# Patient Record
Sex: FEMALE | Race: WHITE | NOT HISPANIC OR LATINO | Employment: OTHER | URBAN - NONMETROPOLITAN AREA
[De-identification: names, ages, dates, MRNs, and addresses within clinical notes are randomized per-mention and may not be internally consistent; named-entity substitution may affect disease eponyms.]

---

## 2023-03-14 DIAGNOSIS — J30.9 ALLERGIC RHINITIS, UNSPECIFIED SEASONALITY, UNSPECIFIED TRIGGER: Primary | ICD-10-CM

## 2023-03-14 DIAGNOSIS — E78.00 HYPERCHOLESTEREMIA: ICD-10-CM

## 2023-03-14 RX ORDER — FLUTICASONE PROPIONATE 50 MCG
1 SPRAY, SUSPENSION (ML) NASAL DAILY
COMMUNITY
Start: 2019-10-15 | End: 2023-03-14 | Stop reason: SDUPTHER

## 2023-03-14 RX ORDER — LOSARTAN POTASSIUM 100 MG/1
100 TABLET ORAL DAILY
Qty: 90 TABLET | Refills: 3 | Status: SHIPPED | OUTPATIENT
Start: 2023-03-14 | End: 2024-01-10

## 2023-03-14 RX ORDER — FLUTICASONE PROPIONATE 50 MCG
1 SPRAY, SUSPENSION (ML) NASAL DAILY
Qty: 16 G | Refills: 3 | Status: SHIPPED | OUTPATIENT
Start: 2023-03-14 | End: 2023-08-17

## 2023-03-14 RX ORDER — ATORVASTATIN CALCIUM 10 MG/1
10 TABLET, FILM COATED ORAL NIGHTLY
Qty: 90 TABLET | Refills: 3 | Status: SHIPPED | OUTPATIENT
Start: 2023-03-14 | End: 2024-01-10

## 2023-03-14 RX ORDER — LOSARTAN POTASSIUM 100 MG/1
1 TABLET ORAL DAILY
COMMUNITY
Start: 2019-10-15 | End: 2023-03-14 | Stop reason: SDUPTHER

## 2023-03-14 RX ORDER — ATORVASTATIN CALCIUM 10 MG/1
1 TABLET, FILM COATED ORAL NIGHTLY
COMMUNITY
Start: 2019-10-15 | End: 2023-03-14 | Stop reason: SDUPTHER

## 2023-03-23 ENCOUNTER — HOSPITAL ENCOUNTER (OUTPATIENT)
Dept: DATA CONVERSION | Facility: HOSPITAL | Age: 78
End: 2023-03-23
Attending: INTERNAL MEDICINE | Admitting: INTERNAL MEDICINE
Payer: MEDICARE

## 2023-03-23 ENCOUNTER — TELEPHONE (OUTPATIENT)
Dept: PRIMARY CARE | Facility: CLINIC | Age: 78
End: 2023-03-23
Payer: MEDICARE

## 2023-03-23 DIAGNOSIS — E78.00 PURE HYPERCHOLESTEROLEMIA, UNSPECIFIED: ICD-10-CM

## 2023-03-23 DIAGNOSIS — K64.8 OTHER HEMORRHOIDS: ICD-10-CM

## 2023-03-23 DIAGNOSIS — D12.2 BENIGN NEOPLASM OF ASCENDING COLON: ICD-10-CM

## 2023-03-23 DIAGNOSIS — D12.5 BENIGN NEOPLASM OF SIGMOID COLON: ICD-10-CM

## 2023-03-23 DIAGNOSIS — Z90.49 ACQUIRED ABSENCE OF OTHER SPECIFIED PARTS OF DIGESTIVE TRACT: ICD-10-CM

## 2023-03-23 DIAGNOSIS — Z86.010 PERSONAL HISTORY OF COLONIC POLYPS: ICD-10-CM

## 2023-03-23 DIAGNOSIS — Z12.11 ENCOUNTER FOR SCREENING FOR MALIGNANT NEOPLASM OF COLON: ICD-10-CM

## 2023-03-23 DIAGNOSIS — K57.30 DIVERTICULOSIS OF LARGE INTESTINE WITHOUT PERFORATION OR ABSCESS WITHOUT BLEEDING: ICD-10-CM

## 2023-03-23 DIAGNOSIS — D12.3 BENIGN NEOPLASM OF TRANSVERSE COLON: ICD-10-CM

## 2023-03-23 DIAGNOSIS — I10 ESSENTIAL (PRIMARY) HYPERTENSION: ICD-10-CM

## 2023-03-23 DIAGNOSIS — Z90.710 ACQUIRED ABSENCE OF BOTH CERVIX AND UTERUS: ICD-10-CM

## 2023-04-03 LAB
COMPLETE PATHOLOGY REPORT: NORMAL
CONVERTED CLINICAL DIAGNOSIS-HISTORY: NORMAL
CONVERTED FINAL DIAGNOSIS: NORMAL
CONVERTED FINAL REPORT PDF LINK TO COPY AND PASTE: NORMAL
CONVERTED GROSS DESCRIPTION: NORMAL

## 2023-04-18 ENCOUNTER — OFFICE VISIT (OUTPATIENT)
Dept: PRIMARY CARE | Facility: CLINIC | Age: 78
End: 2023-04-18
Payer: MEDICARE

## 2023-04-18 VITALS
HEIGHT: 63 IN | WEIGHT: 171 LBS | DIASTOLIC BLOOD PRESSURE: 70 MMHG | SYSTOLIC BLOOD PRESSURE: 144 MMHG | BODY MASS INDEX: 30.3 KG/M2 | HEART RATE: 84 BPM

## 2023-04-18 DIAGNOSIS — M54.50 LOW BACK PAIN RADIATING TO RIGHT LEG: Primary | ICD-10-CM

## 2023-04-18 DIAGNOSIS — M79.604 LOW BACK PAIN RADIATING TO RIGHT LEG: Primary | ICD-10-CM

## 2023-04-18 PROBLEM — F43.20 ADJUSTMENT DISORDER: Status: ACTIVE | Noted: 2023-04-18

## 2023-04-18 PROBLEM — Z86.0100 HISTORY OF COLON POLYPS: Status: ACTIVE | Noted: 2023-04-18

## 2023-04-18 PROBLEM — Z86.79 H/O ABDOMINAL AORTIC ANEURYSM: Status: ACTIVE | Noted: 2023-04-18

## 2023-04-18 PROBLEM — M81.0 OSTEOPOROSIS: Status: ACTIVE | Noted: 2023-04-18

## 2023-04-18 PROBLEM — E66.9 OBESITY: Status: ACTIVE | Noted: 2023-04-18

## 2023-04-18 PROBLEM — K44.9 HERNIA, HIATAL: Status: ACTIVE | Noted: 2023-04-18

## 2023-04-18 PROBLEM — J30.9 ALLERGIC RHINITIS: Status: ACTIVE | Noted: 2023-04-18

## 2023-04-18 PROBLEM — I10 HTN (HYPERTENSION): Status: ACTIVE | Noted: 2023-04-18

## 2023-04-18 PROBLEM — E78.00 HYPERCHOLESTEROLEMIA: Status: ACTIVE | Noted: 2023-04-18

## 2023-04-18 PROBLEM — E11.9 DM2 (DIABETES MELLITUS, TYPE 2) (MULTI): Status: ACTIVE | Noted: 2023-04-18

## 2023-04-18 PROBLEM — M19.90 OSTEOARTHRITIS: Status: ACTIVE | Noted: 2023-04-18

## 2023-04-18 PROBLEM — E78.1 HYPERTRIGLYCERIDEMIA: Status: ACTIVE | Noted: 2023-04-18

## 2023-04-18 PROBLEM — K57.30 COLONIC DIVERTICULAR DISEASE: Status: ACTIVE | Noted: 2023-04-18

## 2023-04-18 PROBLEM — K31.7 GASTRIC POLYPS: Status: ACTIVE | Noted: 2023-04-18

## 2023-04-18 PROBLEM — K21.9 GERD (GASTROESOPHAGEAL REFLUX DISEASE): Status: ACTIVE | Noted: 2023-04-18

## 2023-04-18 PROBLEM — M85.80 OSTEOPENIA: Status: ACTIVE | Noted: 2023-04-18

## 2023-04-18 PROBLEM — I34.1 MVP (MITRAL VALVE PROLAPSE): Status: ACTIVE | Noted: 2023-04-18

## 2023-04-18 PROBLEM — N18.30 STAGE 3 CHRONIC KIDNEY DISEASE (MULTI): Status: ACTIVE | Noted: 2023-04-18

## 2023-04-18 PROBLEM — Z86.010 HISTORY OF COLON POLYPS: Status: ACTIVE | Noted: 2023-04-18

## 2023-04-18 PROCEDURE — 96372 THER/PROPH/DIAG INJ SC/IM: CPT | Performed by: NURSE PRACTITIONER

## 2023-04-18 PROCEDURE — 1160F RVW MEDS BY RX/DR IN RCRD: CPT | Performed by: NURSE PRACTITIONER

## 2023-04-18 PROCEDURE — 1157F ADVNC CARE PLAN IN RCRD: CPT | Performed by: NURSE PRACTITIONER

## 2023-04-18 PROCEDURE — 3077F SYST BP >= 140 MM HG: CPT | Performed by: NURSE PRACTITIONER

## 2023-04-18 PROCEDURE — 1159F MED LIST DOCD IN RCRD: CPT | Performed by: NURSE PRACTITIONER

## 2023-04-18 PROCEDURE — 1036F TOBACCO NON-USER: CPT | Performed by: NURSE PRACTITIONER

## 2023-04-18 PROCEDURE — 99214 OFFICE O/P EST MOD 30 MIN: CPT | Performed by: NURSE PRACTITIONER

## 2023-04-18 PROCEDURE — 3078F DIAST BP <80 MM HG: CPT | Performed by: NURSE PRACTITIONER

## 2023-04-18 RX ORDER — ACETAMINOPHEN 500 MG
1 TABLET ORAL 2 TIMES DAILY
COMMUNITY
Start: 2019-10-15

## 2023-04-18 RX ORDER — ALENDRONATE SODIUM 70 MG/1
70 TABLET ORAL
COMMUNITY
Start: 2023-02-15 | End: 2023-11-30 | Stop reason: SDUPTHER

## 2023-04-18 RX ORDER — MULTIVIT,IRON,MINERALS/LUTEIN
1 TABLET ORAL DAILY
COMMUNITY

## 2023-04-18 RX ORDER — ASCORBIC ACID 500 MG
1 TABLET ORAL DAILY
COMMUNITY

## 2023-04-18 RX ORDER — LORATADINE 10 MG/1
1 TABLET ORAL DAILY PRN
COMMUNITY

## 2023-04-18 RX ORDER — ASPIRIN 81 MG/1
1 TABLET ORAL DAILY
COMMUNITY
Start: 2019-10-15

## 2023-04-18 RX ORDER — BLOOD SUGAR DIAGNOSTIC
1 STRIP MISCELLANEOUS DAILY
COMMUNITY
Start: 2021-09-23

## 2023-04-18 RX ORDER — LIDOCAINE 560 MG/1
1 PATCH PERCUTANEOUS; TOPICAL; TRANSDERMAL SEE ADMIN INSTRUCTIONS
COMMUNITY
Start: 2021-06-23

## 2023-04-18 RX ORDER — AMLODIPINE BESYLATE 5 MG/1
1 TABLET ORAL DAILY
COMMUNITY
Start: 2020-09-30 | End: 2023-12-15 | Stop reason: SDUPTHER

## 2023-04-18 RX ORDER — PREDNISONE 10 MG/1
30 TABLET ORAL DAILY
Qty: 15 TABLET | Refills: 0 | Status: SHIPPED | OUTPATIENT
Start: 2023-04-18 | End: 2024-06-04 | Stop reason: ALTCHOICE

## 2023-04-18 RX ORDER — EPINEPHRINE 0.3 MG/.3ML
1 INJECTION SUBCUTANEOUS AS NEEDED
COMMUNITY
Start: 2021-05-25

## 2023-04-18 RX ORDER — DICLOFENAC SODIUM 10 MG/G
4 GEL TOPICAL 2 TIMES DAILY PRN
COMMUNITY
Start: 2023-02-21 | End: 2023-10-09

## 2023-04-18 RX ORDER — VIT A/VIT C/VIT E/ZINC/COPPER 2148-113
1 TABLET ORAL DAILY
COMMUNITY
Start: 2019-10-15

## 2023-04-18 ASSESSMENT — ENCOUNTER SYMPTOMS
NERVOUS/ANXIOUS: 0
CHEST TIGHTNESS: 0
WOUND: 0
TROUBLE SWALLOWING: 0
FATIGUE: 0
HEADACHES: 0
CHOKING: 0
BLOOD IN STOOL: 0
SLEEP DISTURBANCE: 0
SHORTNESS OF BREATH: 0
EYE REDNESS: 0
ABDOMINAL PAIN: 0
CONFUSION: 0
SEIZURES: 0
NUMBNESS: 0
FACIAL ASYMMETRY: 0
WHEEZING: 0
PALPITATIONS: 0
DIARRHEA: 0
DYSURIA: 0
SORE THROAT: 0
CONSTIPATION: 0
HEMATURIA: 0
NAUSEA: 0
WEAKNESS: 0
UNEXPECTED WEIGHT CHANGE: 0
FLANK PAIN: 0
PHOTOPHOBIA: 0
NECK PAIN: 0
VOMITING: 0
COUGH: 0
DIFFICULTY URINATING: 0
MYALGIAS: 0
FREQUENCY: 0
JOINT SWELLING: 0
APNEA: 0
BACK PAIN: 1
FEVER: 0
CHILLS: 0
SPEECH DIFFICULTY: 0
EYE PAIN: 0
ARTHRALGIAS: 1
ABDOMINAL DISTENTION: 0
DIZZINESS: 0

## 2023-04-18 ASSESSMENT — PATIENT HEALTH QUESTIONNAIRE - PHQ9
1. LITTLE INTEREST OR PLEASURE IN DOING THINGS: NOT AT ALL
2. FEELING DOWN, DEPRESSED OR HOPELESS: NOT AT ALL
SUM OF ALL RESPONSES TO PHQ9 QUESTIONS 1 AND 2: 0

## 2023-04-18 NOTE — PROGRESS NOTES
"Subjective   Patient ID: Jocy Hamlin is a 77 y.o. female who presents for Knee Pain (C/O RIGHT KNEE AND HIP PAIN FOR THE LAST COUPLE DAYS. SHE DID A LOT OF WALKING THE OTHER DAY BUT NO KNOWN INJURY. USING DICLOFENAC GEL AND TYLENOL FOR TREATMENT).    HPI:  Presents today for C/O RIGHT HIP PAIN THAT RADIATES TO KNEE X 2-3 DAYS modifying factors consists of HAS OA AND DEGENERATIVE DISK LUMBAR SPINE. SHE DID A LOT OF WALKING RECENTLY. NO KNOWN INJURY associated symptoms consist of LOW BACK PAIN. NO BOWEL OR BLADDER SYMPTOMS   prior treatment consists of medication DICLOFENAC GEL AND TYLENOL       Visit Vitals  /70   Pulse 84   Ht 1.6 m (5' 3\")   Wt 77.6 kg (171 lb)   BMI 30.29 kg/m²   Smoking Status Never   BSA 1.86 m²        Review of Systems   Constitutional:  Negative for chills, fatigue, fever and unexpected weight change.   HENT:  Negative for congestion, ear pain, sore throat and trouble swallowing.    Eyes:  Negative for photophobia, pain, redness and visual disturbance.   Respiratory:  Negative for apnea, cough, choking, chest tightness, shortness of breath and wheezing.    Cardiovascular:  Negative for chest pain, palpitations and leg swelling.   Gastrointestinal:  Negative for abdominal distention, abdominal pain, blood in stool, constipation, diarrhea, nausea and vomiting.   Genitourinary:  Negative for difficulty urinating, dysuria, flank pain, frequency, hematuria and urgency.   Musculoskeletal:  Positive for arthralgias and back pain. Negative for gait problem, joint swelling, myalgias and neck pain.   Skin:  Negative for rash and wound.   Neurological:  Negative for dizziness, seizures, syncope, facial asymmetry, speech difficulty, weakness, numbness and headaches.   Psychiatric/Behavioral:  Negative for confusion, sleep disturbance and suicidal ideas. The patient is not nervous/anxious.        Objective   Study Result    Narrative & Impression   MRN: [id number]  Patient Name: JOCY HAMLIN   "   STUDY:  SPINE, LUMBOSACRAL  CMPLT(BENDING);  9/21/2022 8:09 am     INDICATION:  Low back pain  M54.50: Low back pain.     COMPARISON:  None.     ACCESSION NUMBER(S):  [id number]     ORDERING CLINICIAN:  CASSANDRA PEREZ     FINDINGS:  Multiple views of the  lumbar spine are obtained. Apparent  osteopenia. Disc space loss from L4 through S1. Endplate sclerosis  and osteophytes are seen throughout. Minimal retrolisthesis of L3 on  L4 without significant interval change on flexion or extension  radiographs.     IMPRESSION:  Discogenic degenerative changes as described.     Physical Exam  Constitutional:       Appearance: Normal appearance. She is normal weight.   HENT:      Head: Normocephalic.   Eyes:      Extraocular Movements: Extraocular movements intact.      Conjunctiva/sclera: Conjunctivae normal.      Pupils: Pupils are equal, round, and reactive to light.   Cardiovascular:      Rate and Rhythm: Normal rate and regular rhythm.      Pulses: Normal pulses.      Heart sounds: Normal heart sounds.   Pulmonary:      Effort: Pulmonary effort is normal.      Breath sounds: Normal breath sounds.   Musculoskeletal:      Cervical back: Normal range of motion.      Comments: RIGHT SIDED LOW BACK PAIN. RIGHT HIP PAIN   Skin:     General: Skin is warm and dry.   Neurological:      General: No focal deficit present.      Mental Status: She is alert and oriented to person, place, and time.   Psychiatric:         Mood and Affect: Mood normal.         Behavior: Behavior normal.         Thought Content: Thought content normal.         Judgment: Judgment normal.            Assessment/Plan   Problem List Items Addressed This Visit          Other    Low back pain radiating to right leg - Primary    Relevant Medications    dexAMETHasone (Decadron) injection 4 mg    predniSONE (Deltasone) 10 mg tablet     WE DID DISCUSSED PT. SHE WILL CALL IF NEEDED.     WE DISCUSSED MOST COMMON SIDE EFFECTS OF PRESCRIBED MEDICATIONS. INDICATIONS,  RISK, COMPLICATIONS, AND ALTERNATIVES OF MEDICATION/THERAPEUTICS WERE EXPLAINED AND DISCUSSED. PLEASE MONITOR CLOSELY FOR ANY UNTOWARD SIDE EFFECTS OR COMPLICATIONS OF MEDICATIONS. PATIENT IS STRONGLY ADVISED TO BE COMPLIANT WITH RECOMMENDATIONS. QUESTIONS AND CONCERNS WERE ADDRESSED. INSTRUCTED TO CALL, RETURN SOONER, OR GO TO THE ER,  IF SYMPTOMS PERSIST OR WORSEN. THEY VOICED UNDERSTANDING AND  DENIES FURTHER QUESTIONS AT THIS TIME.    TIME CODE  1. PREPARATION FOR PATIENT'S VISIT (REVIEWING CHART, CURRENT MEDICAL RECORDS, OUTSIDE HEALTH PROVIDER RECORDS, PREVIOUS HISTORY, EXAM, TEST, PROCEDURE, AND MEDICATIONS)  2. FACE TO FACE ENCOUNTER OBTAINING HISTORY FROM THE PATIENT/FAMILY/CAREGIVERS; PERFORMING EVALUATION AND EXAMINATION; ORDERING TESTS OR PROCEDURES; REFERRING AND COMMUNICATING WITH OTHER HEALTHCARE PROVIDERS; COUNSELING AND EDUCATION OF THE PATIENT/FAMILY/CAREGIVERS; INDEPENDENTLY INTERPRETING RESULTS (TESTS, LABS, PROCEDURES, IMAGING) AND COMMUNICATING AND EXPLAINING RESULTS TO THE PATIENT/FAMILY/CAREGIVERS  3. COORDINATION OF CARE; PREPARING AND PRINTING DISCHARGE INSTRUCTIONS AND ANY EDUCATIONAL MATERIAL FOR THE PATIENT/FAMILY/CAREGIVERS. DOCUMENTING CLINICAL INFORMATION IN THE ELECTRONIC MEDICAL RECORD   4. REVIEWING OARRS AS NEEDED    MDM  1) COMPLEXITY: MORE THAN 1 STABLE CHRONIC CONDITION ADDRESSED OR 1 ACUTE ILLNESS ADDRESSED   2)DATA: TESTS INTERPRETED AND OR ORDERED, TOOK INDEPENDENT HISTORY OR RECORDS REVIEWED  3)RISK: MODERATE RISK DUE TO NATURE OF MEDICAL CONDITIONS/COMORBIDITY OR MEDICATIONS ORDERED OR SURGICAL OR PROCEDURE REFERRAL    Follow up as before

## 2023-05-22 LAB
ALANINE AMINOTRANSFERASE (SGPT) (U/L) IN SER/PLAS: 16 U/L (ref 7–45)
ALBUMIN (G/DL) IN SER/PLAS: 4 G/DL (ref 3.4–5)
ALKALINE PHOSPHATASE (U/L) IN SER/PLAS: 59 U/L (ref 33–136)
ANION GAP IN SER/PLAS: 11 MMOL/L (ref 10–20)
ASPARTATE AMINOTRANSFERASE (SGOT) (U/L) IN SER/PLAS: 20 U/L (ref 9–39)
BILIRUBIN TOTAL (MG/DL) IN SER/PLAS: 0.7 MG/DL (ref 0–1.2)
CALCIUM (MG/DL) IN SER/PLAS: 9.3 MG/DL (ref 8.6–10.3)
CARBON DIOXIDE, TOTAL (MMOL/L) IN SER/PLAS: 28 MMOL/L (ref 21–32)
CHLORIDE (MMOL/L) IN SER/PLAS: 112 MMOL/L (ref 98–107)
CHOLESTEROL (MG/DL) IN SER/PLAS: 138 MG/DL (ref 0–199)
CHOLESTEROL IN HDL (MG/DL) IN SER/PLAS: 49 MG/DL
CHOLESTEROL/HDL RATIO: 2.8
CREATININE (MG/DL) IN SER/PLAS: 1.1 MG/DL (ref 0.5–1.05)
ESTIMATED AVERAGE GLUCOSE FOR HBA1C: 120 MG/DL
GFR FEMALE: 51 ML/MIN/1.73M2
GLUCOSE (MG/DL) IN SER/PLAS: 100 MG/DL (ref 74–99)
HEMOGLOBIN A1C/HEMOGLOBIN TOTAL IN BLOOD: 5.8 %
LDL: 64 MG/DL (ref 0–99)
POTASSIUM (MMOL/L) IN SER/PLAS: 4.3 MMOL/L (ref 3.5–5.3)
PROTEIN TOTAL: 6.6 G/DL (ref 6.4–8.2)
SODIUM (MMOL/L) IN SER/PLAS: 147 MMOL/L (ref 136–145)
TRIGLYCERIDE (MG/DL) IN SER/PLAS: 125 MG/DL (ref 0–149)
UREA NITROGEN (MG/DL) IN SER/PLAS: 19 MG/DL (ref 6–23)
VLDL: 25 MG/DL (ref 0–40)

## 2023-06-05 ENCOUNTER — OFFICE VISIT (OUTPATIENT)
Dept: PRIMARY CARE | Facility: CLINIC | Age: 78
End: 2023-06-05
Payer: MEDICARE

## 2023-06-05 VITALS
WEIGHT: 172 LBS | HEIGHT: 63 IN | SYSTOLIC BLOOD PRESSURE: 141 MMHG | HEART RATE: 64 BPM | DIASTOLIC BLOOD PRESSURE: 92 MMHG | BODY MASS INDEX: 30.48 KG/M2

## 2023-06-05 DIAGNOSIS — I10 PRIMARY HYPERTENSION: ICD-10-CM

## 2023-06-05 DIAGNOSIS — N18.31 TYPE 2 DIABETES MELLITUS WITH STAGE 3A CHRONIC KIDNEY DISEASE, WITHOUT LONG-TERM CURRENT USE OF INSULIN (MULTI): Primary | ICD-10-CM

## 2023-06-05 DIAGNOSIS — N18.31 TYPE 2 DIABETES MELLITUS WITH STAGE 3A CHRONIC KIDNEY DISEASE, WITHOUT LONG-TERM CURRENT USE OF INSULIN (MULTI): ICD-10-CM

## 2023-06-05 DIAGNOSIS — E11.22 TYPE 2 DIABETES MELLITUS WITH STAGE 3A CHRONIC KIDNEY DISEASE, WITHOUT LONG-TERM CURRENT USE OF INSULIN (MULTI): ICD-10-CM

## 2023-06-05 DIAGNOSIS — N18.31 STAGE 3A CHRONIC KIDNEY DISEASE (MULTI): ICD-10-CM

## 2023-06-05 DIAGNOSIS — E11.22 TYPE 2 DIABETES MELLITUS WITH STAGE 3A CHRONIC KIDNEY DISEASE, WITHOUT LONG-TERM CURRENT USE OF INSULIN (MULTI): Primary | ICD-10-CM

## 2023-06-05 PROCEDURE — 99214 OFFICE O/P EST MOD 30 MIN: CPT | Performed by: INTERNAL MEDICINE

## 2023-06-05 PROCEDURE — 1036F TOBACCO NON-USER: CPT | Performed by: INTERNAL MEDICINE

## 2023-06-05 PROCEDURE — 3080F DIAST BP >= 90 MM HG: CPT | Performed by: INTERNAL MEDICINE

## 2023-06-05 PROCEDURE — 1157F ADVNC CARE PLAN IN RCRD: CPT | Performed by: INTERNAL MEDICINE

## 2023-06-05 PROCEDURE — 3077F SYST BP >= 140 MM HG: CPT | Performed by: INTERNAL MEDICINE

## 2023-06-05 PROCEDURE — 1159F MED LIST DOCD IN RCRD: CPT | Performed by: INTERNAL MEDICINE

## 2023-06-05 PROCEDURE — 1160F RVW MEDS BY RX/DR IN RCRD: CPT | Performed by: INTERNAL MEDICINE

## 2023-06-05 ASSESSMENT — ENCOUNTER SYMPTOMS
BACK PAIN: 0
PSYCHIATRIC NEGATIVE: 1
DYSURIA: 0
GASTROINTESTINAL NEGATIVE: 1
PALPITATIONS: 0
CONSTIPATION: 0
CARDIOVASCULAR NEGATIVE: 1
WHEEZING: 0
EYES NEGATIVE: 1
FEVER: 0
NECK STIFFNESS: 0
HEMATOLOGIC/LYMPHATIC NEGATIVE: 1
DIARRHEA: 0
MUSCULOSKELETAL NEGATIVE: 1
HEADACHES: 0
ALLERGIC/IMMUNOLOGIC NEGATIVE: 1
SHORTNESS OF BREATH: 0
ABDOMINAL PAIN: 0
CHILLS: 0
CONSTITUTIONAL NEGATIVE: 1
NUMBNESS: 0
JOINT SWELLING: 0
VOMITING: 0
CONFUSION: 0
RESPIRATORY NEGATIVE: 1
ABDOMINAL DISTENTION: 0
EYE DISCHARGE: 0
AGITATION: 0
ENDOCRINE NEGATIVE: 1
LIGHT-HEADEDNESS: 0
NEUROLOGICAL NEGATIVE: 1
ADENOPATHY: 0
NAUSEA: 0
COUGH: 0

## 2023-06-05 ASSESSMENT — PATIENT HEALTH QUESTIONNAIRE - PHQ9
2. FEELING DOWN, DEPRESSED OR HOPELESS: NOT AT ALL
SUM OF ALL RESPONSES TO PHQ9 QUESTIONS 1 AND 2: 0
1. LITTLE INTEREST OR PLEASURE IN DOING THINGS: NOT AT ALL

## 2023-06-05 NOTE — PROGRESS NOTES
Subjective   Patient ID: Monica Rosales is a 77 y.o. female who presents for Follow-up (4 mo fu labs).   FEELS FINE, NO COMPLANT          Review of Systems   Constitutional: Negative.  Negative for chills and fever.   HENT: Negative.  Negative for congestion.    Eyes: Negative.  Negative for discharge.   Respiratory: Negative.  Negative for cough, shortness of breath and wheezing.    Cardiovascular: Negative.  Negative for chest pain, palpitations and leg swelling.   Gastrointestinal: Negative.  Negative for abdominal distention, abdominal pain, constipation, diarrhea, nausea and vomiting.   Endocrine: Negative.    Genitourinary: Negative.  Negative for dysuria and urgency.   Musculoskeletal: Negative.  Negative for back pain, joint swelling and neck stiffness.   Skin: Negative.  Negative for rash.   Allergic/Immunologic: Negative.  Negative for immunocompromised state.   Neurological: Negative.  Negative for light-headedness, numbness and headaches.   Hematological: Negative.  Negative for adenopathy.   Psychiatric/Behavioral: Negative.  Negative for agitation, behavioral problems and confusion.    All other systems reviewed and are negative.      Objective   Physical Exam  Vitals reviewed.   Constitutional:       General: She is not in acute distress.     Appearance: Normal appearance.   HENT:      Head: Normocephalic and atraumatic.      Nose: Nose normal.   Eyes:      Conjunctiva/sclera: Conjunctivae normal.      Pupils: Pupils are equal, round, and reactive to light.   Neck:      Vascular: No carotid bruit.   Cardiovascular:      Rate and Rhythm: Normal rate and regular rhythm.      Pulses: Normal pulses.      Heart sounds:      No gallop.   Pulmonary:      Effort: Pulmonary effort is normal. No respiratory distress.      Breath sounds: Normal breath sounds. No wheezing.   Abdominal:      General: Bowel sounds are normal.      Palpations: Abdomen is soft.      Tenderness: There is no abdominal tenderness.  "  Musculoskeletal:         General: Normal range of motion.      Cervical back: Normal range of motion. No rigidity.   Lymphadenopathy:      Cervical: No cervical adenopathy.   Skin:     General: Skin is warm.      Findings: No rash.   Neurological:      General: No focal deficit present.      Mental Status: She is alert and oriented to person, place, and time.   Psychiatric:         Mood and Affect: Mood normal.         Behavior: Behavior normal.       BP (!) 141/92 (BP Location: Right arm, Patient Position: Sitting)   Pulse 64   Ht 1.6 m (5' 3\")   Wt 78 kg (172 lb)   BMI 30.47 kg/m²    Hemoglobin A1C   Date/Time Value Ref Range Status   05/22/2023 08:10 AM 5.8 (A) % Final     Comment:          Diagnosis of Diabetes-Adults   Non-Diabetic: < or = 5.6%   Increased risk for developing diabetes: 5.7-6.4%   Diagnostic of diabetes: > or = 6.5%  .       Monitoring of Diabetes                Age (y)     Therapeutic Goal (%)   Adults:          >18           <7.0   Pediatrics:    13-18           <7.5                   7-12           <8.0                   0- 6            7.5-8.5   American Diabetes Association. Diabetes Care 33(S1), Jan 2010.       Assessment/Plan   Problem List Items Addressed This Visit          Circulatory    HTN (hypertension)       Genitourinary    Stage 3 chronic kidney disease (CMS/[facility])       Endocrine/Metabolic    DM2 (diabetes mellitus, type 2) (CMS/[facility]) - Primary    Relevant Orders    Hemoglobin A1C    Comprehensive Metabolic Panel       Bp log at home reviewed very good numbers    MONITOR BP   GOAL BP LOWER THAN 130/80  LOW SALT  EXERCISE DAILY       1800 DEEPAK ADA  HGA1C GOAL LESS THAN 7  LOSE WT  EXERCISE DAILY      Labs reviewed with pt        AVOID NSAIDS  INCREASE FLUID INTAKE      MDM    1) COMPLEXITY: MORE THAN 1 STABLE CHRONIC CONDITION ADDRESSED  2)DATA: TESTS INTERPRETED AND OR ORDERED, TOOK INDEPENDENT HISTORY OR RECORDS REVIEWED  3)RISK: MODERATE RISK DUE TO NATURE OF MEDICAL " CONDITIONS/COMORBIDITY OR MEDICATIONS ORDERED OR SURGICAL OR PROCEDURE REFERRAL, .

## 2023-08-16 DIAGNOSIS — J30.9 ALLERGIC RHINITIS, UNSPECIFIED SEASONALITY, UNSPECIFIED TRIGGER: ICD-10-CM

## 2023-08-17 RX ORDER — FLUTICASONE PROPIONATE 50 MCG
1 SPRAY, SUSPENSION (ML) NASAL DAILY
Qty: 32 G | Refills: 11 | Status: SHIPPED | OUTPATIENT
Start: 2023-08-17 | End: 2024-06-04 | Stop reason: SINTOL

## 2023-09-08 VITALS — BODY MASS INDEX: 29.65 KG/M2 | WEIGHT: 167.33 LBS | HEIGHT: 63 IN

## 2023-09-21 ENCOUNTER — LAB (OUTPATIENT)
Dept: LAB | Facility: LAB | Age: 78
End: 2023-09-21
Payer: MEDICARE

## 2023-09-21 DIAGNOSIS — N18.31 TYPE 2 DIABETES MELLITUS WITH STAGE 3A CHRONIC KIDNEY DISEASE, WITHOUT LONG-TERM CURRENT USE OF INSULIN (MULTI): ICD-10-CM

## 2023-09-21 DIAGNOSIS — E11.22 TYPE 2 DIABETES MELLITUS WITH STAGE 3A CHRONIC KIDNEY DISEASE, WITHOUT LONG-TERM CURRENT USE OF INSULIN (MULTI): ICD-10-CM

## 2023-09-21 LAB
ALANINE AMINOTRANSFERASE (SGPT) (U/L) IN SER/PLAS: 15 U/L (ref 7–45)
ALBUMIN (G/DL) IN SER/PLAS: 3.9 G/DL (ref 3.4–5)
ALKALINE PHOSPHATASE (U/L) IN SER/PLAS: 60 U/L (ref 33–136)
ANION GAP IN SER/PLAS: 12 MMOL/L (ref 10–20)
ASPARTATE AMINOTRANSFERASE (SGOT) (U/L) IN SER/PLAS: 19 U/L (ref 9–39)
BILIRUBIN TOTAL (MG/DL) IN SER/PLAS: 0.9 MG/DL (ref 0–1.2)
CALCIUM (MG/DL) IN SER/PLAS: 9.3 MG/DL (ref 8.6–10.3)
CARBON DIOXIDE, TOTAL (MMOL/L) IN SER/PLAS: 28 MMOL/L (ref 21–32)
CHLORIDE (MMOL/L) IN SER/PLAS: 104 MMOL/L (ref 98–107)
CREATININE (MG/DL) IN SER/PLAS: 1.12 MG/DL (ref 0.5–1.05)
ESTIMATED AVERAGE GLUCOSE FOR HBA1C: 123 MG/DL
GFR FEMALE: 50 ML/MIN/1.73M2
GLUCOSE (MG/DL) IN SER/PLAS: 95 MG/DL (ref 74–99)
HEMOGLOBIN A1C/HEMOGLOBIN TOTAL IN BLOOD: 5.9 %
POTASSIUM (MMOL/L) IN SER/PLAS: 4.5 MMOL/L (ref 3.5–5.3)
PROTEIN TOTAL: 5.9 G/DL (ref 6.4–8.2)
SODIUM (MMOL/L) IN SER/PLAS: 139 MMOL/L (ref 136–145)
UREA NITROGEN (MG/DL) IN SER/PLAS: 20 MG/DL (ref 6–23)

## 2023-09-21 PROCEDURE — 83036 HEMOGLOBIN GLYCOSYLATED A1C: CPT

## 2023-09-21 PROCEDURE — 36415 COLL VENOUS BLD VENIPUNCTURE: CPT

## 2023-09-21 PROCEDURE — 80053 COMPREHEN METABOLIC PANEL: CPT

## 2023-10-04 ENCOUNTER — OFFICE VISIT (OUTPATIENT)
Dept: PRIMARY CARE | Facility: CLINIC | Age: 78
End: 2023-10-04
Payer: MEDICARE

## 2023-10-04 VITALS
BODY MASS INDEX: 31.36 KG/M2 | SYSTOLIC BLOOD PRESSURE: 121 MMHG | HEIGHT: 63 IN | DIASTOLIC BLOOD PRESSURE: 72 MMHG | HEART RATE: 55 BPM | WEIGHT: 177 LBS

## 2023-10-04 DIAGNOSIS — Z23 NEED FOR IMMUNIZATION AGAINST INFLUENZA: ICD-10-CM

## 2023-10-04 DIAGNOSIS — E78.00 HYPERCHOLESTEROLEMIA: ICD-10-CM

## 2023-10-04 DIAGNOSIS — G25.0 ESSENTIAL TREMOR: ICD-10-CM

## 2023-10-04 DIAGNOSIS — Z12.31 SCREENING MAMMOGRAM FOR BREAST CANCER: ICD-10-CM

## 2023-10-04 DIAGNOSIS — E78.1 HYPERTRIGLYCERIDEMIA: ICD-10-CM

## 2023-10-04 DIAGNOSIS — N18.31 STAGE 3A CHRONIC KIDNEY DISEASE (MULTI): ICD-10-CM

## 2023-10-04 DIAGNOSIS — E11.9 TYPE 2 DIABETES MELLITUS WITHOUT COMPLICATION, WITHOUT LONG-TERM CURRENT USE OF INSULIN (MULTI): Primary | ICD-10-CM

## 2023-10-04 DIAGNOSIS — I10 PRIMARY HYPERTENSION: ICD-10-CM

## 2023-10-04 PROCEDURE — 1159F MED LIST DOCD IN RCRD: CPT | Performed by: INTERNAL MEDICINE

## 2023-10-04 PROCEDURE — 1160F RVW MEDS BY RX/DR IN RCRD: CPT | Performed by: INTERNAL MEDICINE

## 2023-10-04 PROCEDURE — 99214 OFFICE O/P EST MOD 30 MIN: CPT | Performed by: INTERNAL MEDICINE

## 2023-10-04 PROCEDURE — G0008 ADMIN INFLUENZA VIRUS VAC: HCPCS | Performed by: INTERNAL MEDICINE

## 2023-10-04 PROCEDURE — 1036F TOBACCO NON-USER: CPT | Performed by: INTERNAL MEDICINE

## 2023-10-04 PROCEDURE — 90662 IIV NO PRSV INCREASED AG IM: CPT | Performed by: INTERNAL MEDICINE

## 2023-10-04 PROCEDURE — 3078F DIAST BP <80 MM HG: CPT | Performed by: INTERNAL MEDICINE

## 2023-10-04 PROCEDURE — 3074F SYST BP LT 130 MM HG: CPT | Performed by: INTERNAL MEDICINE

## 2023-10-04 ASSESSMENT — ENCOUNTER SYMPTOMS
SHORTNESS OF BREATH: 0
MUSCULOSKELETAL NEGATIVE: 1
NUMBNESS: 0
PSYCHIATRIC NEGATIVE: 1
CARDIOVASCULAR NEGATIVE: 1
HEADACHES: 0
GASTROINTESTINAL NEGATIVE: 1
VOMITING: 0
LIGHT-HEADEDNESS: 0
COUGH: 0
ALLERGIC/IMMUNOLOGIC NEGATIVE: 1
ADENOPATHY: 0
ABDOMINAL PAIN: 0
BACK PAIN: 0
CONSTIPATION: 0
AGITATION: 0
NAUSEA: 0
DYSURIA: 0
DIARRHEA: 0
HEMATOLOGIC/LYMPHATIC NEGATIVE: 1
WHEEZING: 0
RESPIRATORY NEGATIVE: 1
ENDOCRINE NEGATIVE: 1
PALPITATIONS: 0
EYES NEGATIVE: 1
JOINT SWELLING: 0
CHILLS: 0
NEUROLOGICAL NEGATIVE: 1
NECK STIFFNESS: 0
CONSTITUTIONAL NEGATIVE: 1
ABDOMINAL DISTENTION: 0
EYE DISCHARGE: 0
CONFUSION: 0
FEVER: 0

## 2023-10-04 NOTE — PROGRESS NOTES
Subjective   Patient ID: Monica Roslaes is a 78 y.o. female who presents for Follow-up (4 MO FULAB).  Here for fu with labs  FEELS FINE  SCO SLIGHT TREMOR        Review of Systems   Constitutional: Negative.  Negative for chills and fever.   HENT: Negative.  Negative for congestion.    Eyes: Negative.  Negative for discharge.   Respiratory: Negative.  Negative for cough, shortness of breath and wheezing.    Cardiovascular: Negative.  Negative for chest pain, palpitations and leg swelling.   Gastrointestinal: Negative.  Negative for abdominal distention, abdominal pain, constipation, diarrhea, nausea and vomiting.   Endocrine: Negative.    Genitourinary: Negative.  Negative for dysuria and urgency.   Musculoskeletal: Negative.  Negative for back pain, joint swelling and neck stiffness.   Skin: Negative.  Negative for rash.   Allergic/Immunologic: Negative.  Negative for immunocompromised state.   Neurological: Negative.  Negative for light-headedness, numbness and headaches.   Hematological: Negative.  Negative for adenopathy.   Psychiatric/Behavioral: Negative.  Negative for agitation, behavioral problems and confusion.    All other systems reviewed and are negative.      Objective   Physical Exam  Vitals reviewed.   Constitutional:       General: She is not in acute distress.     Appearance: Normal appearance.   HENT:      Head: Normocephalic and atraumatic.      Nose: Nose normal.   Eyes:      Conjunctiva/sclera: Conjunctivae normal.      Pupils: Pupils are equal, round, and reactive to light.   Neck:      Vascular: No carotid bruit.   Cardiovascular:      Rate and Rhythm: Normal rate and regular rhythm.      Pulses: Normal pulses.      Heart sounds:      No gallop.   Pulmonary:      Effort: Pulmonary effort is normal. No respiratory distress.      Breath sounds: Normal breath sounds. No wheezing.   Abdominal:      General: Bowel sounds are normal.      Palpations: Abdomen is soft.      Tenderness: There is no  "abdominal tenderness.   Musculoskeletal:         General: Normal range of motion.      Cervical back: Normal range of motion. No rigidity.   Lymphadenopathy:      Cervical: No cervical adenopathy.   Skin:     General: Skin is warm.      Findings: No rash.   Neurological:      General: No focal deficit present.      Mental Status: She is alert and oriented to person, place, and time.      Comments: SLIGHT TREMOR   Psychiatric:         Mood and Affect: Mood normal.         Behavior: Behavior normal.       /72 (BP Location: Left arm, Patient Position: Sitting)   Pulse 55   Ht 1.6 m (5' 3\")   Wt 80.3 kg (177 lb)   BMI 31.35 kg/m²    Hemoglobin A1C   Date/Time Value Ref Range Status   09/21/2023 08:01 AM 5.9 (A) % Final     Comment:          Diagnosis of Diabetes-Adults   Non-Diabetic: < or = 5.6%   Increased risk for developing diabetes: 5.7-6.4%   Diagnostic of diabetes: > or = 6.5%  .       Monitoring of Diabetes                Age (y)     Therapeutic Goal (%)   Adults:          >18           <7.0   Pediatrics:    13-18           <7.5                   7-12           <8.0                   0- 6            7.5-8.5   American Diabetes Association. Diabetes Care 33(S1), Jan 2010.     Assessment/Plan   Problem List Items Addressed This Visit       DM2 (diabetes mellitus, type 2) (CMS/HCC) - Primary    Relevant Orders    Comprehensive Metabolic Panel    Hemoglobin A1C    HTN (hypertension)    Relevant Orders    Comprehensive Metabolic Panel    Hypercholesterolemia    Hypertriglyceridemia    Stage 3 chronic kidney disease (CMS/HCC)    Essential tremor     Other Visit Diagnoses       Need for immunization against influenza        Relevant Orders    Flu vaccine, quadrivalent, high-dose, preservative free, age 65y+ (FLUZONE) (Completed)    Screening mammogram for breast cancer        Relevant Orders    BI mammo bilateral screening tomosynthesis             AVOID NSAIDS  INCREASE FLUID INTAKE    Labs reviewed with " pt    1800 DEEPAK ADA  HGA1C GOAL LESS THAN 7  LOSE WT  EXERCISE DAILY    MONITOR BP   GOAL BP LOWER THAN 130/80  LOW SALT  EXERCISE DAILY      MDM    1) COMPLEXITY: MORE THAN 1 STABLE CHRONIC CONDITION ADDRESSED  2)DATA: TESTS INTERPRETED AND OR ORDERED, TOOK INDEPENDENT HISTORY OR RECORDS REVIEWED  3)RISK: MODERATE RISK DUE TO NATURE OF MEDICAL CONDITIONS/COMORBIDITY OR MEDICATIONS ORDERED OR SURGICAL OR PROCEDURE REFERRAL, .

## 2023-10-09 DIAGNOSIS — M19.90 OSTEOARTHRITIS, UNSPECIFIED OSTEOARTHRITIS TYPE, UNSPECIFIED SITE: Primary | ICD-10-CM

## 2023-10-09 RX ORDER — DICLOFENAC SODIUM 10 MG/G
GEL TOPICAL
Qty: 300 G | Refills: 10 | Status: SHIPPED | OUTPATIENT
Start: 2023-10-09 | End: 2024-02-07 | Stop reason: SDUPTHER

## 2023-10-11 ENCOUNTER — HOSPITAL ENCOUNTER (OUTPATIENT)
Dept: RADIOLOGY | Facility: HOSPITAL | Age: 78
End: 2023-10-11
Payer: MEDICARE

## 2023-10-11 ENCOUNTER — HOSPITAL ENCOUNTER (OUTPATIENT)
Dept: RADIOLOGY | Facility: HOSPITAL | Age: 78
Discharge: HOME | End: 2023-10-11
Payer: MEDICARE

## 2023-10-11 DIAGNOSIS — Z12.31 SCREENING MAMMOGRAM FOR BREAST CANCER: ICD-10-CM

## 2023-10-11 DIAGNOSIS — R92.8 ABNORMAL MAMMOGRAM OF RIGHT BREAST: ICD-10-CM

## 2023-10-11 PROCEDURE — 77067 SCR MAMMO BI INCL CAD: CPT | Mod: 50

## 2023-10-11 PROCEDURE — 77063 BREAST TOMOSYNTHESIS BI: CPT | Mod: BILATERAL PROCEDURE | Performed by: RADIOLOGY

## 2023-10-11 PROCEDURE — 77067 SCR MAMMO BI INCL CAD: CPT | Mod: BILATERAL PROCEDURE | Performed by: RADIOLOGY

## 2023-10-19 ENCOUNTER — TELEPHONE (OUTPATIENT)
Dept: PRIMARY CARE | Facility: CLINIC | Age: 78
End: 2023-10-19
Payer: MEDICARE

## 2023-10-19 DIAGNOSIS — M79.604 LOW BACK PAIN RADIATING TO RIGHT LEG: Primary | ICD-10-CM

## 2023-10-19 DIAGNOSIS — M54.50 LOW BACK PAIN RADIATING TO RIGHT LEG: Primary | ICD-10-CM

## 2023-10-19 RX ORDER — PREDNISONE 10 MG/1
10 TABLET ORAL 3 TIMES DAILY
Qty: 15 TABLET | Refills: 0 | Status: SHIPPED | OUTPATIENT
Start: 2023-10-19 | End: 2023-10-24

## 2023-10-19 NOTE — TELEPHONE ENCOUNTER
Patient requesting another round of prednisone says she still has some lingering back pain and doesn't want injection

## 2023-11-30 DIAGNOSIS — M81.0 OSTEOPOROSIS, UNSPECIFIED OSTEOPOROSIS TYPE, UNSPECIFIED PATHOLOGICAL FRACTURE PRESENCE: ICD-10-CM

## 2023-11-30 RX ORDER — ALENDRONATE SODIUM 70 MG/1
70 TABLET ORAL
Qty: 90 TABLET | Refills: 3 | Status: SHIPPED | OUTPATIENT
Start: 2023-11-30 | End: 2023-12-04 | Stop reason: SDUPTHER

## 2023-12-04 DIAGNOSIS — M81.0 OSTEOPOROSIS, UNSPECIFIED OSTEOPOROSIS TYPE, UNSPECIFIED PATHOLOGICAL FRACTURE PRESENCE: ICD-10-CM

## 2023-12-04 RX ORDER — ALENDRONATE SODIUM 70 MG/1
70 TABLET ORAL
Qty: 12 TABLET | Refills: 3 | Status: SHIPPED | OUTPATIENT
Start: 2023-12-04

## 2023-12-15 DIAGNOSIS — I10 PRIMARY HYPERTENSION: Primary | ICD-10-CM

## 2023-12-17 RX ORDER — AMLODIPINE BESYLATE 5 MG/1
5 TABLET ORAL DAILY
Qty: 90 TABLET | Refills: 3 | Status: SHIPPED | OUTPATIENT
Start: 2023-12-17

## 2024-01-10 DIAGNOSIS — E78.00 HYPERCHOLESTEREMIA: ICD-10-CM

## 2024-01-10 RX ORDER — ATORVASTATIN CALCIUM 10 MG/1
10 TABLET, FILM COATED ORAL NIGHTLY
Qty: 90 TABLET | Refills: 3 | Status: SHIPPED | OUTPATIENT
Start: 2024-01-10

## 2024-01-10 RX ORDER — LOSARTAN POTASSIUM 100 MG/1
100 TABLET ORAL DAILY
Qty: 90 TABLET | Refills: 3 | Status: SHIPPED | OUTPATIENT
Start: 2024-01-10

## 2024-01-26 ENCOUNTER — LAB (OUTPATIENT)
Dept: LAB | Facility: LAB | Age: 79
End: 2024-01-26
Payer: MEDICARE

## 2024-01-26 DIAGNOSIS — E11.9 TYPE 2 DIABETES MELLITUS WITHOUT COMPLICATION, WITHOUT LONG-TERM CURRENT USE OF INSULIN (MULTI): ICD-10-CM

## 2024-01-26 DIAGNOSIS — I10 PRIMARY HYPERTENSION: ICD-10-CM

## 2024-01-26 LAB
ALBUMIN SERPL BCP-MCNC: 4.1 G/DL (ref 3.4–5)
ALP SERPL-CCNC: 57 U/L (ref 33–136)
ALT SERPL W P-5'-P-CCNC: 16 U/L (ref 7–45)
ANION GAP SERPL CALC-SCNC: 14 MMOL/L (ref 10–20)
AST SERPL W P-5'-P-CCNC: 21 U/L (ref 9–39)
BILIRUB SERPL-MCNC: 0.7 MG/DL (ref 0–1.2)
BUN SERPL-MCNC: 19 MG/DL (ref 6–23)
CALCIUM SERPL-MCNC: 9.3 MG/DL (ref 8.6–10.3)
CHLORIDE SERPL-SCNC: 104 MMOL/L (ref 98–107)
CO2 SERPL-SCNC: 28 MMOL/L (ref 21–32)
CREAT SERPL-MCNC: 1.09 MG/DL (ref 0.5–1.05)
EGFRCR SERPLBLD CKD-EPI 2021: 52 ML/MIN/1.73M*2
EST. AVERAGE GLUCOSE BLD GHB EST-MCNC: 123 MG/DL
GLUCOSE SERPL-MCNC: 113 MG/DL (ref 74–99)
HBA1C MFR BLD: 5.9 %
POTASSIUM SERPL-SCNC: 4.5 MMOL/L (ref 3.5–5.3)
PROT SERPL-MCNC: 6.1 G/DL (ref 6.4–8.2)
SODIUM SERPL-SCNC: 141 MMOL/L (ref 136–145)

## 2024-01-26 PROCEDURE — 83036 HEMOGLOBIN GLYCOSYLATED A1C: CPT

## 2024-01-26 PROCEDURE — 36415 COLL VENOUS BLD VENIPUNCTURE: CPT

## 2024-01-26 PROCEDURE — 80053 COMPREHEN METABOLIC PANEL: CPT

## 2024-02-07 ENCOUNTER — OFFICE VISIT (OUTPATIENT)
Dept: PRIMARY CARE | Facility: CLINIC | Age: 79
End: 2024-02-07
Payer: MEDICARE

## 2024-02-07 VITALS
WEIGHT: 175 LBS | HEIGHT: 63 IN | BODY MASS INDEX: 31.01 KG/M2 | SYSTOLIC BLOOD PRESSURE: 149 MMHG | HEART RATE: 69 BPM | DIASTOLIC BLOOD PRESSURE: 89 MMHG

## 2024-02-07 DIAGNOSIS — N18.31 TYPE 2 DIABETES MELLITUS WITH STAGE 3A CHRONIC KIDNEY DISEASE, WITHOUT LONG-TERM CURRENT USE OF INSULIN (MULTI): ICD-10-CM

## 2024-02-07 DIAGNOSIS — E78.00 HYPERCHOLESTEROLEMIA: ICD-10-CM

## 2024-02-07 DIAGNOSIS — M19.90 OSTEOARTHRITIS, UNSPECIFIED OSTEOARTHRITIS TYPE, UNSPECIFIED SITE: ICD-10-CM

## 2024-02-07 DIAGNOSIS — E11.22 TYPE 2 DIABETES MELLITUS WITH STAGE 3A CHRONIC KIDNEY DISEASE, WITHOUT LONG-TERM CURRENT USE OF INSULIN (MULTI): ICD-10-CM

## 2024-02-07 DIAGNOSIS — E78.1 HYPERTRIGLYCERIDEMIA: ICD-10-CM

## 2024-02-07 DIAGNOSIS — I10 PRIMARY HYPERTENSION: ICD-10-CM

## 2024-02-07 DIAGNOSIS — M15.9 OSTEOARTHRITIS, GENERALIZED: ICD-10-CM

## 2024-02-07 DIAGNOSIS — Z00.00 ROUTINE GENERAL MEDICAL EXAMINATION AT HEALTH CARE FACILITY: Primary | ICD-10-CM

## 2024-02-07 DIAGNOSIS — N18.31 STAGE 3A CHRONIC KIDNEY DISEASE (MULTI): ICD-10-CM

## 2024-02-07 PROCEDURE — 99214 OFFICE O/P EST MOD 30 MIN: CPT | Performed by: INTERNAL MEDICINE

## 2024-02-07 PROCEDURE — 1158F ADVNC CARE PLAN TLK DOCD: CPT | Performed by: INTERNAL MEDICINE

## 2024-02-07 PROCEDURE — 3079F DIAST BP 80-89 MM HG: CPT | Performed by: INTERNAL MEDICINE

## 2024-02-07 PROCEDURE — 1157F ADVNC CARE PLAN IN RCRD: CPT | Performed by: INTERNAL MEDICINE

## 2024-02-07 PROCEDURE — G0439 PPPS, SUBSEQ VISIT: HCPCS | Performed by: INTERNAL MEDICINE

## 2024-02-07 PROCEDURE — 1123F ACP DISCUSS/DSCN MKR DOCD: CPT | Performed by: INTERNAL MEDICINE

## 2024-02-07 PROCEDURE — 1036F TOBACCO NON-USER: CPT | Performed by: INTERNAL MEDICINE

## 2024-02-07 PROCEDURE — 3077F SYST BP >= 140 MM HG: CPT | Performed by: INTERNAL MEDICINE

## 2024-02-07 PROCEDURE — 1159F MED LIST DOCD IN RCRD: CPT | Performed by: INTERNAL MEDICINE

## 2024-02-07 PROCEDURE — 1170F FXNL STATUS ASSESSED: CPT | Performed by: INTERNAL MEDICINE

## 2024-02-07 RX ORDER — DICLOFENAC SODIUM 10 MG/G
4 GEL TOPICAL 2 TIMES DAILY PRN
Qty: 100 G | Refills: 3 | Status: SHIPPED | OUTPATIENT
Start: 2024-02-07 | End: 2025-02-06

## 2024-02-07 ASSESSMENT — ENCOUNTER SYMPTOMS
DYSURIA: 0
ABDOMINAL PAIN: 0
RESPIRATORY NEGATIVE: 1
CONSTITUTIONAL NEGATIVE: 1
ENDOCRINE NEGATIVE: 1
FEVER: 0
PSYCHIATRIC NEGATIVE: 1
MUSCULOSKELETAL NEGATIVE: 1
NECK STIFFNESS: 0
LIGHT-HEADEDNESS: 0
JOINT SWELLING: 0
ADENOPATHY: 0
VOMITING: 0
BACK PAIN: 0
COUGH: 0
NEUROLOGICAL NEGATIVE: 1
ABDOMINAL DISTENTION: 0
EYE DISCHARGE: 0
NUMBNESS: 0
CARDIOVASCULAR NEGATIVE: 1
WHEEZING: 0
CHILLS: 0
AGITATION: 0
HEADACHES: 0
ALLERGIC/IMMUNOLOGIC NEGATIVE: 1
EYES NEGATIVE: 1
PALPITATIONS: 0
CONFUSION: 0
HEMATOLOGIC/LYMPHATIC NEGATIVE: 1
NAUSEA: 0
CONSTIPATION: 0
DIARRHEA: 0
GASTROINTESTINAL NEGATIVE: 1
SHORTNESS OF BREATH: 0

## 2024-02-07 ASSESSMENT — ACTIVITIES OF DAILY LIVING (ADL)
DOING_HOUSEWORK: INDEPENDENT
BATHING: INDEPENDENT
MANAGING_FINANCES: INDEPENDENT
TAKING_MEDICATION: INDEPENDENT
GROCERY_SHOPPING: INDEPENDENT
DRESSING: INDEPENDENT

## 2024-02-07 ASSESSMENT — PATIENT HEALTH QUESTIONNAIRE - PHQ9
SUM OF ALL RESPONSES TO PHQ9 QUESTIONS 1 AND 2: 0
1. LITTLE INTEREST OR PLEASURE IN DOING THINGS: NOT AT ALL
2. FEELING DOWN, DEPRESSED OR HOPELESS: NOT AT ALL
2. FEELING DOWN, DEPRESSED OR HOPELESS: NOT AT ALL
1. LITTLE INTEREST OR PLEASURE IN DOING THINGS: NOT AT ALL
SUM OF ALL RESPONSES TO PHQ9 QUESTIONS 1 AND 2: 0

## 2024-02-07 NOTE — PROGRESS NOTES
"Subjective   Reason for Visit: Monica Rosales is an 78 y.o. female here for a Medicare Wellness visit.     Past Medical, Surgical, and Family History reviewed and updated in chart.    Reviewed all medications by prescribing practitioner or clinical pharmacist (such as prescriptions, OTCs, herbal therapies and supplements) and documented in the medical record.    Here for fu with labs feels fine    Wellness exam        Patient Care Team:  Loyd Hampton MD as PCP - General  Loyd Hampton MD as PCP - [employer] PCP     Review of Systems   Constitutional: Negative.  Negative for chills and fever.   HENT: Negative.  Negative for congestion.    Eyes: Negative.  Negative for discharge.   Respiratory: Negative.  Negative for cough, shortness of breath and wheezing.    Cardiovascular: Negative.  Negative for chest pain, palpitations and leg swelling.   Gastrointestinal: Negative.  Negative for abdominal distention, abdominal pain, constipation, diarrhea, nausea and vomiting.   Endocrine: Negative.    Genitourinary: Negative.  Negative for dysuria and urgency.   Musculoskeletal: Negative.  Negative for back pain, joint swelling and neck stiffness.   Skin: Negative.  Negative for rash.   Allergic/Immunologic: Negative.  Negative for immunocompromised state.   Neurological: Negative.  Negative for light-headedness, numbness and headaches.   Hematological: Negative.  Negative for adenopathy.   Psychiatric/Behavioral: Negative.  Negative for agitation, behavioral problems and confusion.    All other systems reviewed and are negative.      Objective   Vitals:  /89 (BP Location: Right arm, Patient Position: Sitting)   Pulse 69   Ht 1.6 m (5' 3\")   Wt 79.4 kg (175 lb)   BMI 31.00 kg/m²       Physical Exam  Vitals reviewed.   Constitutional:       General: She is not in acute distress.     Appearance: Normal appearance.   HENT:      Head: Normocephalic and atraumatic.      Nose: Nose normal. "   Eyes:      Conjunctiva/sclera: Conjunctivae normal.      Pupils: Pupils are equal, round, and reactive to light.   Neck:      Vascular: No carotid bruit.   Cardiovascular:      Rate and Rhythm: Normal rate and regular rhythm.      Pulses: Normal pulses.      Heart sounds:      No gallop.   Pulmonary:      Effort: Pulmonary effort is normal. No respiratory distress.      Breath sounds: Normal breath sounds. No wheezing.   Abdominal:      General: Bowel sounds are normal.      Palpations: Abdomen is soft.      Tenderness: There is no abdominal tenderness.   Musculoskeletal:         General: Normal range of motion.      Cervical back: Normal range of motion. No rigidity.   Lymphadenopathy:      Cervical: No cervical adenopathy.   Skin:     General: Skin is warm.      Findings: No rash.   Neurological:      General: No focal deficit present.      Mental Status: She is alert and oriented to person, place, and time.   Psychiatric:         Mood and Affect: Mood normal.         Behavior: Behavior normal.         Assessment/Plan   Problem List Items Addressed This Visit       DM2 (diabetes mellitus, type 2) (CMS/[facility])    Relevant Orders    Hemoglobin A1C    Comprehensive Metabolic Panel    HTN (hypertension)    Relevant Orders    Comprehensive Metabolic Panel    Hypercholesterolemia    Relevant Orders    Comprehensive Metabolic Panel    Lipid Panel    Hypertriglyceridemia    Relevant Orders    Comprehensive Metabolic Panel    Lipid Panel    Osteoarthritis    Relevant Medications    diclofenac sodium (Voltaren) 1 % gel    Stage 3 chronic kidney disease (CMS/[facility])    Relevant Orders    Comprehensive Metabolic Panel     Other Visit Diagnoses       Routine general medical examination at health care facility    -  Primary    Relevant Orders    Comprehensive Metabolic Panel    Osteoarthritis, generalized              Advanced Care Planing discussed, diagnosis , treatment and prognosis discussed with pt,pt has capacity to make own  decision, pt has a living will, to bring a copy for the chart.      MONITOR BP   GOAL BP LOWER THAN 130/80  LOW SALT  EXERCISE DAILY    1800 DEEPAK ADA  HGA1C GOAL LESS THAN 7  LOSE WT  EXERCISE DAILY  More protein      MDM    1) COMPLEXITY: MORE THAN 1 STABLE CHRONIC CONDITION ADDRESSED  2)DATA: TESTS INTERPRETED AND OR ORDERED, TOOK INDEPENDENT HISTORY OR RECORDS REVIEWED  3)RISK: MODERATE RISK DUE TO NATURE OF MEDICAL CONDITIONS/COMORBIDITY OR MEDICATIONS ORDERED OR SURGICAL OR PROCEDURE REFERRAL, .      Fu 4 mo bw        4 = No assist / stand by assistance

## 2024-05-14 ENCOUNTER — LAB (OUTPATIENT)
Dept: LAB | Facility: LAB | Age: 79
End: 2024-05-14
Payer: MEDICARE

## 2024-05-14 DIAGNOSIS — N18.31 STAGE 3A CHRONIC KIDNEY DISEASE (MULTI): ICD-10-CM

## 2024-05-14 DIAGNOSIS — I10 PRIMARY HYPERTENSION: ICD-10-CM

## 2024-05-14 DIAGNOSIS — E78.1 HYPERTRIGLYCERIDEMIA: ICD-10-CM

## 2024-05-14 DIAGNOSIS — E11.22 TYPE 2 DIABETES MELLITUS WITH STAGE 3A CHRONIC KIDNEY DISEASE, WITHOUT LONG-TERM CURRENT USE OF INSULIN (MULTI): ICD-10-CM

## 2024-05-14 DIAGNOSIS — N18.31 TYPE 2 DIABETES MELLITUS WITH STAGE 3A CHRONIC KIDNEY DISEASE, WITHOUT LONG-TERM CURRENT USE OF INSULIN (MULTI): ICD-10-CM

## 2024-05-14 DIAGNOSIS — Z00.00 ROUTINE GENERAL MEDICAL EXAMINATION AT HEALTH CARE FACILITY: ICD-10-CM

## 2024-05-14 DIAGNOSIS — E78.00 HYPERCHOLESTEROLEMIA: ICD-10-CM

## 2024-05-14 LAB
ALBUMIN SERPL BCP-MCNC: 4 G/DL (ref 3.4–5)
ALP SERPL-CCNC: 62 U/L (ref 33–136)
ALT SERPL W P-5'-P-CCNC: 22 U/L (ref 7–45)
ANION GAP SERPL CALC-SCNC: 11 MMOL/L (ref 10–20)
AST SERPL W P-5'-P-CCNC: 22 U/L (ref 9–39)
BILIRUB SERPL-MCNC: 0.7 MG/DL (ref 0–1.2)
BUN SERPL-MCNC: 20 MG/DL (ref 6–23)
CALCIUM SERPL-MCNC: 9.1 MG/DL (ref 8.6–10.3)
CHLORIDE SERPL-SCNC: 103 MMOL/L (ref 98–107)
CHOLEST SERPL-MCNC: 132 MG/DL (ref 0–199)
CHOLESTEROL/HDL RATIO: 2.8
CO2 SERPL-SCNC: 27 MMOL/L (ref 21–32)
CREAT SERPL-MCNC: 1 MG/DL (ref 0.5–1.05)
EGFRCR SERPLBLD CKD-EPI 2021: 57 ML/MIN/1.73M*2
EST. AVERAGE GLUCOSE BLD GHB EST-MCNC: 123 MG/DL
GLUCOSE SERPL-MCNC: 95 MG/DL (ref 74–99)
HBA1C MFR BLD: 5.9 %
HDLC SERPL-MCNC: 47 MG/DL
LDLC SERPL CALC-MCNC: 54 MG/DL
NON HDL CHOLESTEROL: 85 MG/DL (ref 0–149)
POTASSIUM SERPL-SCNC: 4.1 MMOL/L (ref 3.5–5.3)
PROT SERPL-MCNC: 6.2 G/DL (ref 6.4–8.2)
SODIUM SERPL-SCNC: 137 MMOL/L (ref 136–145)
TRIGL SERPL-MCNC: 154 MG/DL (ref 0–149)
VLDL: 31 MG/DL (ref 0–40)

## 2024-05-14 PROCEDURE — 36415 COLL VENOUS BLD VENIPUNCTURE: CPT

## 2024-05-14 PROCEDURE — 80053 COMPREHEN METABOLIC PANEL: CPT

## 2024-05-14 PROCEDURE — 83036 HEMOGLOBIN GLYCOSYLATED A1C: CPT

## 2024-05-14 PROCEDURE — 80061 LIPID PANEL: CPT

## 2024-05-28 ASSESSMENT — ENCOUNTER SYMPTOMS
BLURRED VISION: 0
NECK PAIN: 0
HEADACHES: 0
ORTHOPNEA: 0
SWEATS: 0
PND: 0
PALPITATIONS: 0
SHORTNESS OF BREATH: 0
HYPERTENSION: 1

## 2024-06-04 ENCOUNTER — OFFICE VISIT (OUTPATIENT)
Dept: PRIMARY CARE | Facility: CLINIC | Age: 79
End: 2024-06-04
Payer: MEDICARE

## 2024-06-04 VITALS
HEART RATE: 81 BPM | BODY MASS INDEX: 31.2 KG/M2 | HEIGHT: 63 IN | SYSTOLIC BLOOD PRESSURE: 126 MMHG | DIASTOLIC BLOOD PRESSURE: 84 MMHG | WEIGHT: 176.1 LBS

## 2024-06-04 DIAGNOSIS — I10 PRIMARY HYPERTENSION: ICD-10-CM

## 2024-06-04 DIAGNOSIS — M19.91 PRIMARY OSTEOARTHRITIS, UNSPECIFIED SITE: ICD-10-CM

## 2024-06-04 DIAGNOSIS — N18.31 STAGE 3A CHRONIC KIDNEY DISEASE (MULTI): ICD-10-CM

## 2024-06-04 DIAGNOSIS — E78.00 HYPERCHOLESTEROLEMIA: ICD-10-CM

## 2024-06-04 DIAGNOSIS — N18.31 TYPE 2 DIABETES MELLITUS WITH STAGE 3A CHRONIC KIDNEY DISEASE, WITHOUT LONG-TERM CURRENT USE OF INSULIN (MULTI): Primary | ICD-10-CM

## 2024-06-04 DIAGNOSIS — E11.22 TYPE 2 DIABETES MELLITUS WITH STAGE 3A CHRONIC KIDNEY DISEASE, WITHOUT LONG-TERM CURRENT USE OF INSULIN (MULTI): Primary | ICD-10-CM

## 2024-06-04 PROCEDURE — 1159F MED LIST DOCD IN RCRD: CPT | Performed by: INTERNAL MEDICINE

## 2024-06-04 PROCEDURE — 3074F SYST BP LT 130 MM HG: CPT | Performed by: INTERNAL MEDICINE

## 2024-06-04 PROCEDURE — 1158F ADVNC CARE PLAN TLK DOCD: CPT | Performed by: INTERNAL MEDICINE

## 2024-06-04 PROCEDURE — 1123F ACP DISCUSS/DSCN MKR DOCD: CPT | Performed by: INTERNAL MEDICINE

## 2024-06-04 PROCEDURE — 99214 OFFICE O/P EST MOD 30 MIN: CPT | Performed by: INTERNAL MEDICINE

## 2024-06-04 PROCEDURE — 3079F DIAST BP 80-89 MM HG: CPT | Performed by: INTERNAL MEDICINE

## 2024-06-04 PROCEDURE — 1160F RVW MEDS BY RX/DR IN RCRD: CPT | Performed by: INTERNAL MEDICINE

## 2024-06-04 PROCEDURE — 1036F TOBACCO NON-USER: CPT | Performed by: INTERNAL MEDICINE

## 2024-06-04 PROCEDURE — 1157F ADVNC CARE PLAN IN RCRD: CPT | Performed by: INTERNAL MEDICINE

## 2024-06-04 ASSESSMENT — ENCOUNTER SYMPTOMS
HYPERTENSION: 1
FEVER: 0
ENDOCRINE NEGATIVE: 1
HEADACHES: 0
SWEATS: 0
VOMITING: 0
EYE DISCHARGE: 0
ALLERGIC/IMMUNOLOGIC NEGATIVE: 1
CONSTIPATION: 0
NEUROLOGICAL NEGATIVE: 1
BACK PAIN: 0
AGITATION: 0
ABDOMINAL DISTENTION: 0
NECK PAIN: 0
HEMATOLOGIC/LYMPHATIC NEGATIVE: 1
ABDOMINAL PAIN: 0
EYES NEGATIVE: 1
DYSURIA: 0
PSYCHIATRIC NEGATIVE: 1
BLURRED VISION: 0
RESPIRATORY NEGATIVE: 1
WHEEZING: 0
PALPITATIONS: 0
NAUSEA: 0
SHORTNESS OF BREATH: 0
CHILLS: 0
ADENOPATHY: 0
NECK STIFFNESS: 0
CONFUSION: 0
PND: 0
ARTHRALGIAS: 1
GASTROINTESTINAL NEGATIVE: 1
JOINT SWELLING: 0
CONSTITUTIONAL NEGATIVE: 1
NUMBNESS: 0
CARDIOVASCULAR NEGATIVE: 1
COUGH: 0
ORTHOPNEA: 0
LIGHT-HEADEDNESS: 0
DIARRHEA: 0

## 2024-06-04 ASSESSMENT — PATIENT HEALTH QUESTIONNAIRE - PHQ9
2. FEELING DOWN, DEPRESSED OR HOPELESS: NOT AT ALL
1. LITTLE INTEREST OR PLEASURE IN DOING THINGS: NOT AT ALL
SUM OF ALL RESPONSES TO PHQ9 QUESTIONS 1 AND 2: 0

## 2024-06-04 NOTE — PROGRESS NOTES
Subjective   Patient ID: Monica Rosales is a 78 y.o. female who presents for Follow-up (4 month follow up with labs).  HERE FOR FU WITH LABS    Co arthritis pain    Hypertension  The current episode started more than 1 year ago. The problem is unchanged. The problem is controlled. Pertinent negatives include no anxiety, blurred vision, chest pain, headaches, malaise/fatigue, neck pain, orthopnea, palpitations, peripheral edema, PND, shortness of breath or sweats. There are no associated agents to hypertension. There are no known risk factors for coronary artery disease. There are no compliance problems.        Review of Systems   Constitutional: Negative.  Negative for chills, fever and malaise/fatigue.   HENT: Negative.  Negative for congestion.    Eyes: Negative.  Negative for blurred vision and discharge.   Respiratory: Negative.  Negative for cough, shortness of breath and wheezing.    Cardiovascular: Negative.  Negative for chest pain, palpitations, orthopnea, leg swelling and PND.   Gastrointestinal: Negative.  Negative for abdominal distention, abdominal pain, constipation, diarrhea, nausea and vomiting.   Endocrine: Negative.    Genitourinary: Negative.  Negative for dysuria and urgency.   Musculoskeletal:  Positive for arthralgias. Negative for back pain, joint swelling, neck pain and neck stiffness.   Skin: Negative.  Negative for rash.   Allergic/Immunologic: Negative.  Negative for immunocompromised state.   Neurological: Negative.  Negative for light-headedness, numbness and headaches.   Hematological: Negative.  Negative for adenopathy.   Psychiatric/Behavioral: Negative.  Negative for agitation, behavioral problems and confusion.    All other systems reviewed and are negative.      Objective   Physical Exam  Vitals reviewed.   Constitutional:       General: She is not in acute distress.     Appearance: Normal appearance.   HENT:      Head: Normocephalic and atraumatic.      Nose: Nose normal.  "  Eyes:      Conjunctiva/sclera: Conjunctivae normal.      Pupils: Pupils are equal, round, and reactive to light.   Neck:      Vascular: No carotid bruit.   Cardiovascular:      Rate and Rhythm: Normal rate and regular rhythm.      Pulses: Normal pulses.      Heart sounds:      No gallop.   Pulmonary:      Effort: Pulmonary effort is normal. No respiratory distress.      Breath sounds: Normal breath sounds. No wheezing.   Abdominal:      General: Bowel sounds are normal.      Palpations: Abdomen is soft.      Tenderness: There is no abdominal tenderness.   Musculoskeletal:         General: Normal range of motion.      Cervical back: Normal range of motion. No rigidity.   Lymphadenopathy:      Cervical: No cervical adenopathy.   Skin:     General: Skin is warm.      Findings: No rash.   Neurological:      General: No focal deficit present.      Mental Status: She is alert and oriented to person, place, and time.   Psychiatric:         Mood and Affect: Mood normal.         Behavior: Behavior normal.       /84 (BP Location: Right arm, Patient Position: Sitting)   Pulse 81   Ht 1.6 m (5' 3\")   Wt 79.9 kg (176 lb 1.6 oz)   BMI 31.19 kg/m²    Hemoglobin A1C   Date/Time Value Ref Range Status   05/14/2024 07:40 AM 5.9 (H) see below % Final     Assessment/Plan   Problem List Items Addressed This Visit       DM2 (diabetes mellitus, type 2) (Multi) - Primary    Relevant Orders    Hemoglobin A1C    Comprehensive Metabolic Panel    HTN (hypertension)    Relevant Orders    Comprehensive Metabolic Panel    Hypercholesterolemia    Relevant Orders    Comprehensive Metabolic Panel    Osteoarthritis    Relevant Orders    Disability Placard    Stage 3 chronic kidney disease (Multi)    Relevant Orders    Comprehensive Metabolic Panel          Chronic kidney disease addressed as follow:    AVOID NSAIDS  INCREASE FLUID INTAKE      HTN addressed as follow:    MONITOR BP   GOAL BP LOWER THAN 130/80  LOW SALT  EXERCISE " DAILY    Labs reviewed with pt      More protein    Diabetes Mellitus/IFG addressed as follow:    1800 DEEPAK ADA  HGA1C GOAL LESS THAN 7  LOSE WT  EXERCISE DAILY      Tylenol otc prn    MDM    1) COMPLEXITY: MORE THAN 1 STABLE CHRONIC CONDITION ADDRESSED  2)DATA: TESTS INTERPRETED AND OR ORDERED, TOOK INDEPENDENT HISTORY OR RECORDS REVIEWED  3)RISK: MODERATE RISK DUE TO NATURE OF MEDICAL CONDITIONS/COMORBIDITY OR MEDICATIONS ORDERED OR SURGICAL OR PROCEDURE REFERRAL, .       5-Fu Counseling: 5-Fluorouracil Counseling:  I discussed with the patient the risks of 5-fluorouracil including but not limited to erythema, scaling, itching, weeping, crusting, and pain.

## 2024-07-29 ENCOUNTER — APPOINTMENT (OUTPATIENT)
Dept: PRIMARY CARE | Facility: CLINIC | Age: 79
End: 2024-07-29
Payer: MEDICARE

## 2024-07-30 ENCOUNTER — APPOINTMENT (OUTPATIENT)
Dept: PRIMARY CARE | Facility: CLINIC | Age: 79
End: 2024-07-30
Payer: MEDICARE

## 2024-07-30 ENCOUNTER — TELEPHONE (OUTPATIENT)
Dept: PRIMARY CARE | Facility: CLINIC | Age: 79
End: 2024-07-30

## 2024-07-30 VITALS
BODY MASS INDEX: 31.18 KG/M2 | DIASTOLIC BLOOD PRESSURE: 87 MMHG | HEART RATE: 67 BPM | HEIGHT: 63 IN | WEIGHT: 176 LBS | SYSTOLIC BLOOD PRESSURE: 131 MMHG

## 2024-07-30 DIAGNOSIS — N18.31 STAGE 3A CHRONIC KIDNEY DISEASE (MULTI): ICD-10-CM

## 2024-07-30 DIAGNOSIS — E11.22 TYPE 2 DIABETES MELLITUS WITH STAGE 3A CHRONIC KIDNEY DISEASE, WITHOUT LONG-TERM CURRENT USE OF INSULIN (MULTI): ICD-10-CM

## 2024-07-30 DIAGNOSIS — N18.31 TYPE 2 DIABETES MELLITUS WITH STAGE 3A CHRONIC KIDNEY DISEASE, WITHOUT LONG-TERM CURRENT USE OF INSULIN (MULTI): ICD-10-CM

## 2024-07-30 DIAGNOSIS — I10 PRIMARY HYPERTENSION: ICD-10-CM

## 2024-07-30 DIAGNOSIS — E11.42 DIABETIC POLYNEUROPATHY ASSOCIATED WITH TYPE 2 DIABETES MELLITUS (MULTI): Primary | ICD-10-CM

## 2024-07-30 DIAGNOSIS — R09.89 WEAK PULSE: ICD-10-CM

## 2024-07-30 PROCEDURE — 1158F ADVNC CARE PLAN TLK DOCD: CPT | Performed by: INTERNAL MEDICINE

## 2024-07-30 PROCEDURE — 1036F TOBACCO NON-USER: CPT | Performed by: INTERNAL MEDICINE

## 2024-07-30 PROCEDURE — 3079F DIAST BP 80-89 MM HG: CPT | Performed by: INTERNAL MEDICINE

## 2024-07-30 PROCEDURE — 1160F RVW MEDS BY RX/DR IN RCRD: CPT | Performed by: INTERNAL MEDICINE

## 2024-07-30 PROCEDURE — 3075F SYST BP GE 130 - 139MM HG: CPT | Performed by: INTERNAL MEDICINE

## 2024-07-30 PROCEDURE — 99214 OFFICE O/P EST MOD 30 MIN: CPT | Performed by: INTERNAL MEDICINE

## 2024-07-30 PROCEDURE — 1159F MED LIST DOCD IN RCRD: CPT | Performed by: INTERNAL MEDICINE

## 2024-07-30 PROCEDURE — 1157F ADVNC CARE PLAN IN RCRD: CPT | Performed by: INTERNAL MEDICINE

## 2024-07-30 PROCEDURE — 1123F ACP DISCUSS/DSCN MKR DOCD: CPT | Performed by: INTERNAL MEDICINE

## 2024-07-30 RX ORDER — DULOXETIN HYDROCHLORIDE 30 MG/1
30 CAPSULE, DELAYED RELEASE ORAL DAILY
Qty: 30 CAPSULE | Refills: 0 | Status: SHIPPED | OUTPATIENT
Start: 2024-07-30 | End: 2024-08-29

## 2024-07-30 ASSESSMENT — ENCOUNTER SYMPTOMS
HEADACHES: 0
DIARRHEA: 0
COUGH: 0
VOMITING: 0
NUMBNESS: 1
SHORTNESS OF BREATH: 0
JOINT SWELLING: 0
PALPITATIONS: 0
EYE DISCHARGE: 0
BACK PAIN: 0
PSYCHIATRIC NEGATIVE: 1
ALLERGIC/IMMUNOLOGIC NEGATIVE: 1
EYES NEGATIVE: 1
WHEEZING: 0
CONSTITUTIONAL NEGATIVE: 1
ADENOPATHY: 0
GASTROINTESTINAL NEGATIVE: 1
RESPIRATORY NEGATIVE: 1
FEVER: 0
AGITATION: 0
CONSTIPATION: 0
ENDOCRINE NEGATIVE: 1
HEMATOLOGIC/LYMPHATIC NEGATIVE: 1
DYSURIA: 0
NECK STIFFNESS: 0
MUSCULOSKELETAL NEGATIVE: 1
CHILLS: 0
CARDIOVASCULAR NEGATIVE: 1
NAUSEA: 0
LIGHT-HEADEDNESS: 0
CONFUSION: 0
ABDOMINAL PAIN: 0
ABDOMINAL DISTENTION: 0

## 2024-07-30 NOTE — PROGRESS NOTES
Subjective   Patient ID: Monica Rosales is a 79 y.o. female who presents for Follow-up (Pt here co bilateral foot pain numbness and tingling x 2 weeks).  B/l feet toes numbness and tingling , more last 2 weeks        Review of Systems   Constitutional: Negative.  Negative for chills and fever.   HENT: Negative.  Negative for congestion.    Eyes: Negative.  Negative for discharge.   Respiratory: Negative.  Negative for cough, shortness of breath and wheezing.    Cardiovascular: Negative.  Negative for chest pain, palpitations and leg swelling.   Gastrointestinal: Negative.  Negative for abdominal distention, abdominal pain, constipation, diarrhea, nausea and vomiting.   Endocrine: Negative.    Genitourinary: Negative.  Negative for dysuria and urgency.   Musculoskeletal: Negative.  Negative for back pain, joint swelling and neck stiffness.   Skin: Negative.  Negative for rash.   Allergic/Immunologic: Negative.  Negative for immunocompromised state.   Neurological:  Positive for numbness. Negative for light-headedness and headaches.   Hematological: Negative.  Negative for adenopathy.   Psychiatric/Behavioral: Negative.  Negative for agitation, behavioral problems and confusion.    All other systems reviewed and are negative.      Objective   Physical Exam  Vitals reviewed.   Constitutional:       General: She is not in acute distress.     Appearance: Normal appearance.   HENT:      Head: Normocephalic and atraumatic.      Nose: Nose normal.   Eyes:      Conjunctiva/sclera: Conjunctivae normal.      Pupils: Pupils are equal, round, and reactive to light.   Neck:      Vascular: No carotid bruit.   Cardiovascular:      Rate and Rhythm: Normal rate and regular rhythm.      Heart sounds:      No gallop.      Comments: Weak pedal pulse on the left  Pulmonary:      Effort: Pulmonary effort is normal. No respiratory distress.      Breath sounds: Normal breath sounds. No wheezing.   Abdominal:      General: Bowel sounds are  "normal.      Palpations: Abdomen is soft.      Tenderness: There is no abdominal tenderness.   Musculoskeletal:         General: Normal range of motion.      Cervical back: Normal range of motion. No rigidity.   Lymphadenopathy:      Cervical: No cervical adenopathy.   Skin:     General: Skin is warm.      Findings: No rash.   Neurological:      General: No focal deficit present.      Mental Status: She is alert and oriented to person, place, and time.      Sensory: Sensory deficit (mildly in the toes) present.   Psychiatric:         Mood and Affect: Mood normal.         Behavior: Behavior normal.       /87 (BP Location: Right arm, Patient Position: Sitting)   Pulse 67   Ht 1.6 m (5' 3\")   Wt 79.8 kg (176 lb)   BMI 31.18 kg/m²    Hemoglobin A1C   Date/Time Value Ref Range Status   05/14/2024 07:40 AM 5.9 (H) see below % Final     Assessment/Plan   Problem List Items Addressed This Visit       DM2 (diabetes mellitus, type 2) (Multi)    HTN (hypertension)    Stage 3 chronic kidney disease (Multi)    Relevant Orders    Vitamin D 25-Hydroxy,Total (for eval of Vitamin D levels)    Diabetic polyneuropathy associated with type 2 diabetes mellitus (Multi) - Primary    Relevant Medications    DULoxetine (Cymbalta) 30 mg DR capsule    Other Relevant Orders    Folate    Vitamin B12    TSH with reflex to Free T4 if abnormal    Vitamin D 25-Hydroxy,Total (for eval of Vitamin D levels)    Vascular US Lower Extremity Arterial Duplex Bilateral With UCHE     Other Visit Diagnoses       Weak pulse        Relevant Orders    Vascular US Lower Extremity Arterial Duplex Bilateral With UCHE               HTN addressed as follow:    MONITOR BP   GOAL BP LOWER THAN 130/80  LOW SALT  EXERCISE DAILY    Diabetes Mellitus/IFG addressed as follow:    1800 DEEPAK ADA  HGA1C GOAL LESS THAN 7  LOSE WT  EXERCISE DAILY      MDM    1) COMPLEXITY: 1 OR MORE CHRONIC CONDITION WITH EXACERBATION, OR PROGRESSION OR SIDE EFFECT OF TREATMENT " ADDRESSED  2)DATA: TESTS INTERPRETED AND OR ORDERED, TOOK INDEPENDENT HISTORY OR RECORDS REVIEWED  3)RISK: MODERATE RISK DUE TO NATURE OF MEDICAL CONDITIONS/COMORBIDITY OR MEDICATIONS ORDERED OR SURGICAL OR PROCEDURE REFERRAL, .        Fu 1 mo for reeval

## 2024-08-05 ENCOUNTER — HOSPITAL ENCOUNTER (OUTPATIENT)
Dept: VASCULAR MEDICINE | Facility: HOSPITAL | Age: 79
Discharge: HOME | End: 2024-08-05
Payer: MEDICARE

## 2024-08-05 DIAGNOSIS — I73.9 PERIPHERAL VASCULAR DISEASE, UNSPECIFIED (CMS-HCC): ICD-10-CM

## 2024-08-05 DIAGNOSIS — E11.42 DIABETIC POLYNEUROPATHY ASSOCIATED WITH TYPE 2 DIABETES MELLITUS (MULTI): ICD-10-CM

## 2024-08-05 DIAGNOSIS — R09.89 WEAK PULSE: ICD-10-CM

## 2024-08-05 PROCEDURE — 93922 UPR/L XTREMITY ART 2 LEVELS: CPT | Performed by: STUDENT IN AN ORGANIZED HEALTH CARE EDUCATION/TRAINING PROGRAM

## 2024-08-05 PROCEDURE — 93922 UPR/L XTREMITY ART 2 LEVELS: CPT

## 2024-08-06 ENCOUNTER — LAB (OUTPATIENT)
Dept: LAB | Facility: LAB | Age: 79
End: 2024-08-06
Payer: MEDICARE

## 2024-08-06 DIAGNOSIS — I10 PRIMARY HYPERTENSION: ICD-10-CM

## 2024-08-06 DIAGNOSIS — E11.22 TYPE 2 DIABETES MELLITUS WITH STAGE 3A CHRONIC KIDNEY DISEASE, WITHOUT LONG-TERM CURRENT USE OF INSULIN (MULTI): ICD-10-CM

## 2024-08-06 DIAGNOSIS — E78.00 HYPERCHOLESTEROLEMIA: ICD-10-CM

## 2024-08-06 DIAGNOSIS — E11.42 DIABETIC POLYNEUROPATHY ASSOCIATED WITH TYPE 2 DIABETES MELLITUS (MULTI): ICD-10-CM

## 2024-08-06 DIAGNOSIS — N18.31 STAGE 3A CHRONIC KIDNEY DISEASE (MULTI): ICD-10-CM

## 2024-08-06 DIAGNOSIS — N18.31 TYPE 2 DIABETES MELLITUS WITH STAGE 3A CHRONIC KIDNEY DISEASE, WITHOUT LONG-TERM CURRENT USE OF INSULIN (MULTI): ICD-10-CM

## 2024-08-06 LAB
25(OH)D3 SERPL-MCNC: 39 NG/ML (ref 30–100)
ALBUMIN SERPL BCP-MCNC: 4.1 G/DL (ref 3.4–5)
ALP SERPL-CCNC: 63 U/L (ref 33–136)
ALT SERPL W P-5'-P-CCNC: 30 U/L (ref 7–45)
ANION GAP SERPL CALC-SCNC: 12 MMOL/L (ref 10–20)
AST SERPL W P-5'-P-CCNC: 28 U/L (ref 9–39)
BILIRUB SERPL-MCNC: 0.7 MG/DL (ref 0–1.2)
BUN SERPL-MCNC: 20 MG/DL (ref 6–23)
CALCIUM SERPL-MCNC: 9.6 MG/DL (ref 8.6–10.3)
CHLORIDE SERPL-SCNC: 102 MMOL/L (ref 98–107)
CO2 SERPL-SCNC: 29 MMOL/L (ref 21–32)
CREAT SERPL-MCNC: 1.11 MG/DL (ref 0.5–1.05)
EGFRCR SERPLBLD CKD-EPI 2021: 50 ML/MIN/1.73M*2
EST. AVERAGE GLUCOSE BLD GHB EST-MCNC: 117 MG/DL
FOLATE SERPL-MCNC: >22.3 NG/ML
GLUCOSE SERPL-MCNC: 96 MG/DL (ref 74–99)
HBA1C MFR BLD: 5.7 %
POTASSIUM SERPL-SCNC: 4.7 MMOL/L (ref 3.5–5.3)
PROT SERPL-MCNC: 6.6 G/DL (ref 6.4–8.2)
SODIUM SERPL-SCNC: 138 MMOL/L (ref 136–145)
TSH SERPL-ACNC: 2.44 MIU/L (ref 0.44–3.98)
VIT B12 SERPL-MCNC: 1095 PG/ML (ref 211–911)

## 2024-08-06 PROCEDURE — 36415 COLL VENOUS BLD VENIPUNCTURE: CPT

## 2024-08-06 PROCEDURE — 83036 HEMOGLOBIN GLYCOSYLATED A1C: CPT

## 2024-08-06 PROCEDURE — 82746 ASSAY OF FOLIC ACID SERUM: CPT

## 2024-08-06 PROCEDURE — 84443 ASSAY THYROID STIM HORMONE: CPT

## 2024-08-06 PROCEDURE — 80053 COMPREHEN METABOLIC PANEL: CPT

## 2024-08-06 PROCEDURE — 82306 VITAMIN D 25 HYDROXY: CPT

## 2024-08-06 PROCEDURE — 82607 VITAMIN B-12: CPT

## 2024-08-21 DIAGNOSIS — E11.42 DIABETIC POLYNEUROPATHY ASSOCIATED WITH TYPE 2 DIABETES MELLITUS (MULTI): ICD-10-CM

## 2024-08-21 RX ORDER — DULOXETIN HYDROCHLORIDE 30 MG/1
30 CAPSULE, DELAYED RELEASE ORAL DAILY
Qty: 90 CAPSULE | Refills: 3 | Status: SHIPPED | OUTPATIENT
Start: 2024-08-21 | End: 2025-08-21

## 2024-09-01 DIAGNOSIS — M81.0 OSTEOPOROSIS, UNSPECIFIED OSTEOPOROSIS TYPE, UNSPECIFIED PATHOLOGICAL FRACTURE PRESENCE: ICD-10-CM

## 2024-09-03 ENCOUNTER — APPOINTMENT (OUTPATIENT)
Dept: PRIMARY CARE | Facility: CLINIC | Age: 79
End: 2024-09-03
Payer: MEDICARE

## 2024-09-03 VITALS
HEIGHT: 63 IN | HEART RATE: 86 BPM | WEIGHT: 175 LBS | DIASTOLIC BLOOD PRESSURE: 80 MMHG | BODY MASS INDEX: 31.01 KG/M2 | SYSTOLIC BLOOD PRESSURE: 126 MMHG

## 2024-09-03 DIAGNOSIS — E11.42 DIABETIC POLYNEUROPATHY ASSOCIATED WITH TYPE 2 DIABETES MELLITUS (MULTI): ICD-10-CM

## 2024-09-03 DIAGNOSIS — N18.31 STAGE 3A CHRONIC KIDNEY DISEASE (MULTI): ICD-10-CM

## 2024-09-03 DIAGNOSIS — E11.22 TYPE 2 DIABETES MELLITUS WITH STAGE 3A CHRONIC KIDNEY DISEASE, WITHOUT LONG-TERM CURRENT USE OF INSULIN (MULTI): Primary | ICD-10-CM

## 2024-09-03 DIAGNOSIS — N18.31 TYPE 2 DIABETES MELLITUS WITH STAGE 3A CHRONIC KIDNEY DISEASE, WITHOUT LONG-TERM CURRENT USE OF INSULIN (MULTI): Primary | ICD-10-CM

## 2024-09-03 DIAGNOSIS — I10 PRIMARY HYPERTENSION: ICD-10-CM

## 2024-09-03 PROCEDURE — 1157F ADVNC CARE PLAN IN RCRD: CPT | Performed by: INTERNAL MEDICINE

## 2024-09-03 PROCEDURE — 1123F ACP DISCUSS/DSCN MKR DOCD: CPT | Performed by: INTERNAL MEDICINE

## 2024-09-03 PROCEDURE — 1036F TOBACCO NON-USER: CPT | Performed by: INTERNAL MEDICINE

## 2024-09-03 PROCEDURE — 1160F RVW MEDS BY RX/DR IN RCRD: CPT | Performed by: INTERNAL MEDICINE

## 2024-09-03 PROCEDURE — 3079F DIAST BP 80-89 MM HG: CPT | Performed by: INTERNAL MEDICINE

## 2024-09-03 PROCEDURE — 1158F ADVNC CARE PLAN TLK DOCD: CPT | Performed by: INTERNAL MEDICINE

## 2024-09-03 PROCEDURE — 99214 OFFICE O/P EST MOD 30 MIN: CPT | Performed by: INTERNAL MEDICINE

## 2024-09-03 PROCEDURE — 1159F MED LIST DOCD IN RCRD: CPT | Performed by: INTERNAL MEDICINE

## 2024-09-03 PROCEDURE — 3074F SYST BP LT 130 MM HG: CPT | Performed by: INTERNAL MEDICINE

## 2024-09-03 RX ORDER — ALENDRONATE SODIUM 70 MG/1
70 TABLET ORAL
Qty: 12 TABLET | Refills: 3 | Status: SHIPPED | OUTPATIENT
Start: 2024-09-03

## 2024-09-03 ASSESSMENT — ENCOUNTER SYMPTOMS
JOINT SWELLING: 0
ALLERGIC/IMMUNOLOGIC NEGATIVE: 1
SHORTNESS OF BREATH: 0
MUSCULOSKELETAL NEGATIVE: 1
LIGHT-HEADEDNESS: 0
GASTROINTESTINAL NEGATIVE: 1
COUGH: 0
EYES NEGATIVE: 1
HEMATOLOGIC/LYMPHATIC NEGATIVE: 1
ENDOCRINE NEGATIVE: 1
ADENOPATHY: 0
NAUSEA: 0
VOMITING: 0
RESPIRATORY NEGATIVE: 1
ABDOMINAL DISTENTION: 0
ABDOMINAL PAIN: 0
HEADACHES: 0
AGITATION: 0
CONSTIPATION: 0
PSYCHIATRIC NEGATIVE: 1
CARDIOVASCULAR NEGATIVE: 1
EYE DISCHARGE: 0
CONFUSION: 0
CHILLS: 0
NEUROLOGICAL NEGATIVE: 1
NUMBNESS: 0
NECK STIFFNESS: 0
PALPITATIONS: 0
WHEEZING: 0
DYSURIA: 0
CONSTITUTIONAL NEGATIVE: 1
FEVER: 0
BACK PAIN: 0
DIARRHEA: 0

## 2024-09-03 ASSESSMENT — PATIENT HEALTH QUESTIONNAIRE - PHQ9
SUM OF ALL RESPONSES TO PHQ9 QUESTIONS 1 AND 2: 0
2. FEELING DOWN, DEPRESSED OR HOPELESS: NOT AT ALL
1. LITTLE INTEREST OR PLEASURE IN DOING THINGS: NOT AT ALL

## 2024-09-03 NOTE — PROGRESS NOTES
Subjective   Patient ID: Monica Rosales is a 79 y.o. female who presents for Follow-up (1 mo doppler).  Here for fu after labs and doppler feels fine    Cymbalta helped a lot for neuropathy        Review of Systems   Constitutional: Negative.  Negative for chills and fever.   HENT: Negative.  Negative for congestion.    Eyes: Negative.  Negative for discharge.   Respiratory: Negative.  Negative for cough, shortness of breath and wheezing.    Cardiovascular: Negative.  Negative for chest pain, palpitations and leg swelling.   Gastrointestinal: Negative.  Negative for abdominal distention, abdominal pain, constipation, diarrhea, nausea and vomiting.   Endocrine: Negative.    Genitourinary: Negative.  Negative for dysuria and urgency.   Musculoskeletal: Negative.  Negative for back pain, joint swelling and neck stiffness.   Skin: Negative.  Negative for rash.   Allergic/Immunologic: Negative.  Negative for immunocompromised state.   Neurological: Negative.  Negative for light-headedness, numbness and headaches.   Hematological: Negative.  Negative for adenopathy.   Psychiatric/Behavioral: Negative.  Negative for agitation, behavioral problems and confusion.    All other systems reviewed and are negative.      Objective   Physical Exam  Vitals reviewed.   Constitutional:       General: She is not in acute distress.     Appearance: Normal appearance.   HENT:      Head: Normocephalic and atraumatic.      Nose: Nose normal.   Eyes:      Conjunctiva/sclera: Conjunctivae normal.      Pupils: Pupils are equal, round, and reactive to light.   Neck:      Vascular: No carotid bruit.   Cardiovascular:      Rate and Rhythm: Normal rate and regular rhythm.      Pulses: Normal pulses.      Heart sounds:      No gallop.   Pulmonary:      Effort: Pulmonary effort is normal. No respiratory distress.      Breath sounds: Normal breath sounds. No wheezing.   Abdominal:      General: Bowel sounds are normal.      Palpations: Abdomen is  "soft.      Tenderness: There is no abdominal tenderness.   Musculoskeletal:         General: Normal range of motion.      Cervical back: Normal range of motion. No rigidity.   Lymphadenopathy:      Cervical: No cervical adenopathy.   Skin:     General: Skin is warm.      Findings: No rash.   Neurological:      General: No focal deficit present.      Mental Status: She is alert and oriented to person, place, and time.   Psychiatric:         Mood and Affect: Mood normal.         Behavior: Behavior normal.       /80   Pulse 86   Ht 1.6 m (5' 3\")   Wt 79.4 kg (175 lb)   BMI 31.00 kg/m²    Hemoglobin A1C   Date/Time Value Ref Range Status   08/06/2024 09:46 AM 5.7 (H) see below % Final     Assessment/Plan   Problem List Items Addressed This Visit       DM2 (diabetes mellitus, type 2) (Multi) - Primary    Relevant Orders    Comprehensive Metabolic Panel    Hemoglobin A1C    HTN (hypertension)    Stage 3 chronic kidney disease (Multi)    Diabetic polyneuropathy associated with type 2 diabetes mellitus (Multi)        Chronic kidney disease addressed as follow:    AVOID NSAIDS  INCREASE FLUID INTAKE      Diabetes Mellitus/IFG addressed as follow:    1800 DEEPAK ADA  HGA1C GOAL LESS THAN 7  LOSE WT  EXERCISE DAILY      HTN addressed as follow:    MONITOR BP   GOAL BP LOWER THAN 130/80  LOW SALT  EXERCISE DAILY    Labs reviewed with pt    Continue cymbalta since very effective for neuropathy      Doppler dw pt    Fu 4 mo bw  "

## 2024-10-06 DIAGNOSIS — I10 PRIMARY HYPERTENSION: ICD-10-CM

## 2024-10-07 ENCOUNTER — APPOINTMENT (OUTPATIENT)
Dept: PRIMARY CARE | Facility: CLINIC | Age: 79
End: 2024-10-07
Payer: MEDICARE

## 2024-10-07 RX ORDER — AMLODIPINE BESYLATE 5 MG/1
5 TABLET ORAL DAILY
Qty: 90 TABLET | Refills: 3 | Status: SHIPPED | OUTPATIENT
Start: 2024-10-07

## 2024-10-08 ENCOUNTER — CLINICAL SUPPORT (OUTPATIENT)
Dept: PRIMARY CARE | Facility: CLINIC | Age: 79
End: 2024-10-08
Payer: MEDICARE

## 2024-10-08 DIAGNOSIS — L40.9 PSORIASIS: ICD-10-CM

## 2024-10-08 DIAGNOSIS — Z23 NEED FOR INFLUENZA VACCINATION: ICD-10-CM

## 2024-10-08 DIAGNOSIS — L40.9 PSORIASIS: Primary | ICD-10-CM

## 2024-10-08 PROCEDURE — G0008 ADMIN INFLUENZA VIRUS VAC: HCPCS | Performed by: INTERNAL MEDICINE

## 2024-10-08 PROCEDURE — 90662 IIV NO PRSV INCREASED AG IM: CPT | Performed by: INTERNAL MEDICINE

## 2024-10-08 RX ORDER — TRIAMCINOLONE ACETONIDE 1 MG/G
CREAM TOPICAL 2 TIMES DAILY
Qty: 15 G | Refills: 0 | Status: SHIPPED | OUTPATIENT
Start: 2024-10-08

## 2024-10-08 RX ORDER — TRIAMCINOLONE ACETONIDE 1 MG/G
CREAM TOPICAL 2 TIMES DAILY
Qty: 15 G | Refills: 0 | Status: SHIPPED | OUTPATIENT
Start: 2024-10-08 | End: 2024-10-08 | Stop reason: SDUPTHER

## 2024-10-08 NOTE — TELEPHONE ENCOUNTER
PT HAD C/O SCALP PSORIASIS AND HAVING A HARD TIME BRUSHING HER HAIR. IS THERE SOMETHING THAT CAN HELP RELIEVE THE FLAIR?

## 2024-10-30 ENCOUNTER — TELEPHONE (OUTPATIENT)
Dept: PRIMARY CARE | Facility: CLINIC | Age: 79
End: 2024-10-30

## 2024-10-30 ENCOUNTER — OFFICE VISIT (OUTPATIENT)
Dept: PRIMARY CARE | Facility: CLINIC | Age: 79
End: 2024-10-30
Payer: MEDICARE

## 2024-10-30 VITALS
WEIGHT: 177 LBS | DIASTOLIC BLOOD PRESSURE: 79 MMHG | HEART RATE: 73 BPM | SYSTOLIC BLOOD PRESSURE: 108 MMHG | BODY MASS INDEX: 31.36 KG/M2 | HEIGHT: 63 IN

## 2024-10-30 DIAGNOSIS — R68.84 MAXILLA PAIN: ICD-10-CM

## 2024-10-30 DIAGNOSIS — E78.00 HYPERCHOLESTEREMIA: ICD-10-CM

## 2024-10-30 DIAGNOSIS — J34.89 NOSE PAIN: ICD-10-CM

## 2024-10-30 DIAGNOSIS — S09.93XA BLUNT TRAUMA OF FACE, INITIAL ENCOUNTER: Primary | ICD-10-CM

## 2024-10-30 PROCEDURE — 3078F DIAST BP <80 MM HG: CPT | Performed by: INTERNAL MEDICINE

## 2024-10-30 PROCEDURE — 1160F RVW MEDS BY RX/DR IN RCRD: CPT | Performed by: INTERNAL MEDICINE

## 2024-10-30 PROCEDURE — 1123F ACP DISCUSS/DSCN MKR DOCD: CPT | Performed by: INTERNAL MEDICINE

## 2024-10-30 PROCEDURE — 99214 OFFICE O/P EST MOD 30 MIN: CPT | Performed by: INTERNAL MEDICINE

## 2024-10-30 PROCEDURE — 1036F TOBACCO NON-USER: CPT | Performed by: INTERNAL MEDICINE

## 2024-10-30 PROCEDURE — 3074F SYST BP LT 130 MM HG: CPT | Performed by: INTERNAL MEDICINE

## 2024-10-30 PROCEDURE — 1159F MED LIST DOCD IN RCRD: CPT | Performed by: INTERNAL MEDICINE

## 2024-10-30 PROCEDURE — 1157F ADVNC CARE PLAN IN RCRD: CPT | Performed by: INTERNAL MEDICINE

## 2024-10-30 RX ORDER — ATORVASTATIN CALCIUM 10 MG/1
10 TABLET, FILM COATED ORAL NIGHTLY
Qty: 90 TABLET | Refills: 3 | Status: SHIPPED | OUTPATIENT
Start: 2024-10-30

## 2024-10-30 RX ORDER — LOSARTAN POTASSIUM 100 MG/1
100 TABLET ORAL DAILY
Qty: 90 TABLET | Refills: 3 | Status: SHIPPED | OUTPATIENT
Start: 2024-10-30

## 2024-10-30 ASSESSMENT — ENCOUNTER SYMPTOMS
NEUROLOGICAL NEGATIVE: 1
NAUSEA: 0
PALPITATIONS: 0
FEVER: 0
MUSCULOSKELETAL NEGATIVE: 1
VOMITING: 0
COUGH: 0
WEAKNESS: 0
GASTROINTESTINAL NEGATIVE: 1
ABDOMINAL PAIN: 0
DIZZINESS: 0
RHINORRHEA: 0
WHEEZING: 0
CARDIOVASCULAR NEGATIVE: 1
LIGHT-HEADEDNESS: 0
EYES NEGATIVE: 1
HEADACHES: 0
BACK PAIN: 0
CHILLS: 0
DYSURIA: 0
SHORTNESS OF BREATH: 0
DIARRHEA: 0
AGITATION: 0
CONSTIPATION: 0
PSYCHIATRIC NEGATIVE: 1
ABDOMINAL DISTENTION: 0
ENDOCRINE NEGATIVE: 1

## 2024-11-01 ENCOUNTER — HOSPITAL ENCOUNTER (OUTPATIENT)
Dept: RADIOLOGY | Facility: HOSPITAL | Age: 79
Discharge: HOME | End: 2024-11-01
Payer: MEDICARE

## 2024-11-01 DIAGNOSIS — T14.90XA TRAUMA: ICD-10-CM

## 2024-11-01 DIAGNOSIS — R68.84 MAXILLA PAIN: ICD-10-CM

## 2024-11-01 DIAGNOSIS — S09.93XA BLUNT TRAUMA OF FACE, INITIAL ENCOUNTER: ICD-10-CM

## 2024-11-01 DIAGNOSIS — J34.89 NOSE PAIN: ICD-10-CM

## 2024-11-01 PROCEDURE — 76377 3D RENDER W/INTRP POSTPROCES: CPT

## 2024-11-01 PROCEDURE — 70486 CT MAXILLOFACIAL W/O DYE: CPT

## 2024-11-05 ENCOUNTER — APPOINTMENT (OUTPATIENT)
Dept: PRIMARY CARE | Facility: CLINIC | Age: 79
End: 2024-11-05
Payer: MEDICARE

## 2024-11-05 VITALS
HEART RATE: 85 BPM | WEIGHT: 177 LBS | DIASTOLIC BLOOD PRESSURE: 80 MMHG | HEIGHT: 63 IN | SYSTOLIC BLOOD PRESSURE: 132 MMHG | BODY MASS INDEX: 31.36 KG/M2

## 2024-11-05 DIAGNOSIS — S09.93XD BLUNT TRAUMA OF FACE, SUBSEQUENT ENCOUNTER: ICD-10-CM

## 2024-11-05 DIAGNOSIS — L98.9 SKIN LESION: Primary | ICD-10-CM

## 2024-11-05 PROCEDURE — 1159F MED LIST DOCD IN RCRD: CPT | Performed by: INTERNAL MEDICINE

## 2024-11-05 PROCEDURE — 3079F DIAST BP 80-89 MM HG: CPT | Performed by: INTERNAL MEDICINE

## 2024-11-05 PROCEDURE — 1158F ADVNC CARE PLAN TLK DOCD: CPT | Performed by: INTERNAL MEDICINE

## 2024-11-05 PROCEDURE — 1123F ACP DISCUSS/DSCN MKR DOCD: CPT | Performed by: INTERNAL MEDICINE

## 2024-11-05 PROCEDURE — 99213 OFFICE O/P EST LOW 20 MIN: CPT | Performed by: INTERNAL MEDICINE

## 2024-11-05 PROCEDURE — 1036F TOBACCO NON-USER: CPT | Performed by: INTERNAL MEDICINE

## 2024-11-05 PROCEDURE — 1157F ADVNC CARE PLAN IN RCRD: CPT | Performed by: INTERNAL MEDICINE

## 2024-11-05 PROCEDURE — 1160F RVW MEDS BY RX/DR IN RCRD: CPT | Performed by: INTERNAL MEDICINE

## 2024-11-05 PROCEDURE — 3075F SYST BP GE 130 - 139MM HG: CPT | Performed by: INTERNAL MEDICINE

## 2024-11-05 ASSESSMENT — ENCOUNTER SYMPTOMS
ABDOMINAL PAIN: 0
WHEEZING: 0
BACK PAIN: 0
ADENOPATHY: 0
NECK STIFFNESS: 0
DYSURIA: 0
ENDOCRINE NEGATIVE: 1
AGITATION: 0
NAUSEA: 0
PSYCHIATRIC NEGATIVE: 1
NUMBNESS: 0
HEMATOLOGIC/LYMPHATIC NEGATIVE: 1
NEUROLOGICAL NEGATIVE: 1
SHORTNESS OF BREATH: 0
CONSTITUTIONAL NEGATIVE: 1
VOMITING: 0
PALPITATIONS: 0
GASTROINTESTINAL NEGATIVE: 1
EYE DISCHARGE: 0
ABDOMINAL DISTENTION: 0
RESPIRATORY NEGATIVE: 1
CONFUSION: 0
COUGH: 0
FEVER: 0
LIGHT-HEADEDNESS: 0
DIARRHEA: 0
CONSTIPATION: 0
CARDIOVASCULAR NEGATIVE: 1
CHILLS: 0
ALLERGIC/IMMUNOLOGIC NEGATIVE: 1
MUSCULOSKELETAL NEGATIVE: 1
JOINT SWELLING: 0
HEADACHES: 0
EYES NEGATIVE: 1

## 2024-11-05 ASSESSMENT — PATIENT HEALTH QUESTIONNAIRE - PHQ9
1. LITTLE INTEREST OR PLEASURE IN DOING THINGS: NOT AT ALL
SUM OF ALL RESPONSES TO PHQ9 QUESTIONS 1 AND 2: 0
2. FEELING DOWN, DEPRESSED OR HOPELESS: NOT AT ALL
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
## 2024-11-12 ENCOUNTER — APPOINTMENT (OUTPATIENT)
Dept: RADIOLOGY | Facility: HOSPITAL | Age: 79
End: 2024-11-12
Payer: MEDICARE

## 2024-11-13 ENCOUNTER — APPOINTMENT (OUTPATIENT)
Dept: RADIOLOGY | Facility: HOSPITAL | Age: 79
End: 2024-11-13
Payer: MEDICARE

## 2024-11-14 ENCOUNTER — APPOINTMENT (OUTPATIENT)
Dept: PRIMARY CARE | Facility: CLINIC | Age: 79
End: 2024-11-14
Payer: MEDICARE

## 2024-12-17 ENCOUNTER — OFFICE VISIT (OUTPATIENT)
Dept: PRIMARY CARE | Facility: CLINIC | Age: 79
End: 2024-12-17
Payer: MEDICARE

## 2024-12-17 VITALS
HEART RATE: 88 BPM | DIASTOLIC BLOOD PRESSURE: 80 MMHG | HEIGHT: 63 IN | BODY MASS INDEX: 31.71 KG/M2 | WEIGHT: 179 LBS | SYSTOLIC BLOOD PRESSURE: 132 MMHG

## 2024-12-17 DIAGNOSIS — L98.9 SKIN LESION OF SCALP: Primary | ICD-10-CM

## 2024-12-17 PROCEDURE — 3079F DIAST BP 80-89 MM HG: CPT | Performed by: INTERNAL MEDICINE

## 2024-12-17 PROCEDURE — 1160F RVW MEDS BY RX/DR IN RCRD: CPT | Performed by: INTERNAL MEDICINE

## 2024-12-17 PROCEDURE — 1123F ACP DISCUSS/DSCN MKR DOCD: CPT | Performed by: INTERNAL MEDICINE

## 2024-12-17 PROCEDURE — 1159F MED LIST DOCD IN RCRD: CPT | Performed by: INTERNAL MEDICINE

## 2024-12-17 PROCEDURE — 1157F ADVNC CARE PLAN IN RCRD: CPT | Performed by: INTERNAL MEDICINE

## 2024-12-17 PROCEDURE — 99214 OFFICE O/P EST MOD 30 MIN: CPT | Performed by: INTERNAL MEDICINE

## 2024-12-17 PROCEDURE — 1036F TOBACCO NON-USER: CPT | Performed by: INTERNAL MEDICINE

## 2024-12-17 PROCEDURE — 3075F SYST BP GE 130 - 139MM HG: CPT | Performed by: INTERNAL MEDICINE

## 2024-12-17 PROCEDURE — 1158F ADVNC CARE PLAN TLK DOCD: CPT | Performed by: INTERNAL MEDICINE

## 2024-12-17 ASSESSMENT — ENCOUNTER SYMPTOMS
GASTROINTESTINAL NEGATIVE: 1
NEUROLOGICAL NEGATIVE: 1
NAUSEA: 0
FEVER: 0
SHORTNESS OF BREATH: 0
VOMITING: 0
NUMBNESS: 0
DYSURIA: 0
HEMATOLOGIC/LYMPHATIC NEGATIVE: 1
ABDOMINAL DISTENTION: 0
BACK PAIN: 0
COUGH: 0
ENDOCRINE NEGATIVE: 1
CONFUSION: 0
NECK STIFFNESS: 0
CONSTITUTIONAL NEGATIVE: 1
CHILLS: 0
JOINT SWELLING: 0
HEADACHES: 0
CONSTIPATION: 0
WHEEZING: 0
RESPIRATORY NEGATIVE: 1
ADENOPATHY: 0
LIGHT-HEADEDNESS: 0
DIARRHEA: 0
ABDOMINAL PAIN: 0
EYES NEGATIVE: 1
ALLERGIC/IMMUNOLOGIC NEGATIVE: 1
CARDIOVASCULAR NEGATIVE: 1
PSYCHIATRIC NEGATIVE: 1
MUSCULOSKELETAL NEGATIVE: 1
PALPITATIONS: 0
AGITATION: 0
EYE DISCHARGE: 0

## 2024-12-17 NOTE — PROGRESS NOTES
Subjective   Patient ID: Monica Rosales is a 79 y.o. female who presents for Follow-up (Pt co skin lesion on scalp that wont heal).  Co nonhealing skin lesion on the scalp for a couple of month , no pain, she reports it was very tiny, put cream on it but got big fast        Review of Systems   Constitutional: Negative.  Negative for chills and fever.   HENT: Negative.  Negative for congestion.    Eyes: Negative.  Negative for discharge.   Respiratory: Negative.  Negative for cough, shortness of breath and wheezing.    Cardiovascular: Negative.  Negative for chest pain, palpitations and leg swelling.   Gastrointestinal: Negative.  Negative for abdominal distention, abdominal pain, constipation, diarrhea, nausea and vomiting.   Endocrine: Negative.    Genitourinary: Negative.  Negative for dysuria and urgency.   Musculoskeletal: Negative.  Negative for back pain, joint swelling and neck stiffness.   Skin: Negative.  Negative for rash.   Allergic/Immunologic: Negative.  Negative for immunocompromised state.   Neurological: Negative.  Negative for light-headedness, numbness and headaches.   Hematological: Negative.  Negative for adenopathy.   Psychiatric/Behavioral: Negative.  Negative for agitation, behavioral problems and confusion.    All other systems reviewed and are negative.      Objective   Physical Exam  Vitals reviewed.   Constitutional:       General: She is not in acute distress.     Appearance: Normal appearance.   HENT:      Head: Normocephalic and atraumatic.      Nose: Nose normal.   Eyes:      Conjunctiva/sclera: Conjunctivae normal.      Pupils: Pupils are equal, round, and reactive to light.   Neck:      Vascular: No carotid bruit.   Cardiovascular:      Rate and Rhythm: Normal rate and regular rhythm.      Pulses: Normal pulses.      Heart sounds:      No gallop.   Pulmonary:      Effort: Pulmonary effort is normal. No respiratory distress.      Breath sounds: Normal breath sounds. No wheezing.  "  Abdominal:      General: Bowel sounds are normal.      Palpations: Abdomen is soft.      Tenderness: There is no abdominal tenderness.   Musculoskeletal:         General: Normal range of motion.      Cervical back: Normal range of motion. No rigidity.   Lymphadenopathy:      Cervical: No cervical adenopathy.   Skin:     General: Skin is warm.      Findings: Lesion (nodular skin lesion with central errosin 1 inch indiameter) present. No rash.   Neurological:      General: No focal deficit present.      Mental Status: She is alert and oriented to person, place, and time.   Psychiatric:         Mood and Affect: Mood normal.         Behavior: Behavior normal.       /80   Pulse 88   Ht 1.6 m (5' 3\")   Wt 81.2 kg (179 lb)   BMI 31.71 kg/m²    Hemoglobin A1C   Date/Time Value Ref Range Status   08/06/2024 09:46 AM 5.7 (H) see below % Final     Assessment/Plan   Problem List Items Addressed This Visit    None  Visit Diagnoses       Skin lesion of scalp    -  Primary    Relevant Orders    Referral to Plastic Surgery             Already has ju with dermatologist 1/8 in [facility]      Will refer to plastic surgeon      Fu before  "

## 2024-12-27 ENCOUNTER — LAB (OUTPATIENT)
Dept: LAB | Facility: LAB | Age: 79
End: 2024-12-27
Payer: MEDICARE

## 2024-12-27 DIAGNOSIS — E11.22 TYPE 2 DIABETES MELLITUS WITH STAGE 3A CHRONIC KIDNEY DISEASE, WITHOUT LONG-TERM CURRENT USE OF INSULIN (MULTI): ICD-10-CM

## 2024-12-27 DIAGNOSIS — N18.31 TYPE 2 DIABETES MELLITUS WITH STAGE 3A CHRONIC KIDNEY DISEASE, WITHOUT LONG-TERM CURRENT USE OF INSULIN (MULTI): ICD-10-CM

## 2024-12-27 LAB
ALBUMIN SERPL BCP-MCNC: 4.3 G/DL (ref 3.4–5)
ALP SERPL-CCNC: 64 U/L (ref 33–136)
ALT SERPL W P-5'-P-CCNC: 15 U/L (ref 7–45)
ANION GAP SERPL CALC-SCNC: 13 MMOL/L (ref 10–20)
AST SERPL W P-5'-P-CCNC: 19 U/L (ref 9–39)
BILIRUB SERPL-MCNC: 0.9 MG/DL (ref 0–1.2)
BUN SERPL-MCNC: 16 MG/DL (ref 6–23)
CALCIUM SERPL-MCNC: 9.2 MG/DL (ref 8.6–10.3)
CHLORIDE SERPL-SCNC: 103 MMOL/L (ref 98–107)
CO2 SERPL-SCNC: 27 MMOL/L (ref 21–32)
CREAT SERPL-MCNC: 1.04 MG/DL (ref 0.5–1.05)
EGFRCR SERPLBLD CKD-EPI 2021: 54 ML/MIN/1.73M*2
GLUCOSE SERPL-MCNC: 120 MG/DL (ref 74–99)
POTASSIUM SERPL-SCNC: 4.3 MMOL/L (ref 3.5–5.3)
PROT SERPL-MCNC: 6.7 G/DL (ref 6.4–8.2)
SODIUM SERPL-SCNC: 139 MMOL/L (ref 136–145)

## 2024-12-27 PROCEDURE — 80053 COMPREHEN METABOLIC PANEL: CPT

## 2024-12-27 PROCEDURE — 83036 HEMOGLOBIN GLYCOSYLATED A1C: CPT

## 2024-12-28 LAB
EST. AVERAGE GLUCOSE BLD GHB EST-MCNC: 111 MG/DL
HBA1C MFR BLD: 5.5 %

## 2025-01-06 ENCOUNTER — APPOINTMENT (OUTPATIENT)
Dept: PRIMARY CARE | Facility: CLINIC | Age: 80
End: 2025-01-06
Payer: MEDICARE

## 2025-01-06 VITALS
WEIGHT: 175 LBS | BODY MASS INDEX: 31.01 KG/M2 | HEIGHT: 63 IN | DIASTOLIC BLOOD PRESSURE: 95 MMHG | HEART RATE: 102 BPM | SYSTOLIC BLOOD PRESSURE: 133 MMHG

## 2025-01-06 DIAGNOSIS — I10 PRIMARY HYPERTENSION: ICD-10-CM

## 2025-01-06 DIAGNOSIS — Z78.0 POST-MENOPAUSAL: ICD-10-CM

## 2025-01-06 DIAGNOSIS — J01.90 ACUTE SINUSITIS, RECURRENCE NOT SPECIFIED, UNSPECIFIED LOCATION: ICD-10-CM

## 2025-01-06 DIAGNOSIS — E78.00 HYPERCHOLESTEROLEMIA: ICD-10-CM

## 2025-01-06 DIAGNOSIS — N18.31 STAGE 3A CHRONIC KIDNEY DISEASE (MULTI): ICD-10-CM

## 2025-01-06 DIAGNOSIS — E78.1 HYPERTRIGLYCERIDEMIA: ICD-10-CM

## 2025-01-06 DIAGNOSIS — E11.22 TYPE 2 DIABETES MELLITUS WITH STAGE 3A CHRONIC KIDNEY DISEASE, WITHOUT LONG-TERM CURRENT USE OF INSULIN (MULTI): Primary | ICD-10-CM

## 2025-01-06 DIAGNOSIS — N18.31 TYPE 2 DIABETES MELLITUS WITH STAGE 3A CHRONIC KIDNEY DISEASE, WITHOUT LONG-TERM CURRENT USE OF INSULIN (MULTI): Primary | ICD-10-CM

## 2025-01-06 PROCEDURE — 3075F SYST BP GE 130 - 139MM HG: CPT | Performed by: INTERNAL MEDICINE

## 2025-01-06 PROCEDURE — 99214 OFFICE O/P EST MOD 30 MIN: CPT | Performed by: INTERNAL MEDICINE

## 2025-01-06 PROCEDURE — 1157F ADVNC CARE PLAN IN RCRD: CPT | Performed by: INTERNAL MEDICINE

## 2025-01-06 PROCEDURE — 1160F RVW MEDS BY RX/DR IN RCRD: CPT | Performed by: INTERNAL MEDICINE

## 2025-01-06 PROCEDURE — 1036F TOBACCO NON-USER: CPT | Performed by: INTERNAL MEDICINE

## 2025-01-06 PROCEDURE — 3080F DIAST BP >= 90 MM HG: CPT | Performed by: INTERNAL MEDICINE

## 2025-01-06 PROCEDURE — 1123F ACP DISCUSS/DSCN MKR DOCD: CPT | Performed by: INTERNAL MEDICINE

## 2025-01-06 PROCEDURE — 1159F MED LIST DOCD IN RCRD: CPT | Performed by: INTERNAL MEDICINE

## 2025-01-06 PROCEDURE — G2211 COMPLEX E/M VISIT ADD ON: HCPCS | Performed by: INTERNAL MEDICINE

## 2025-01-06 RX ORDER — AZITHROMYCIN 250 MG/1
TABLET, FILM COATED ORAL
Qty: 6 TABLET | Refills: 0 | Status: SHIPPED | OUTPATIENT
Start: 2025-01-06 | End: 2025-01-11

## 2025-01-06 ASSESSMENT — ENCOUNTER SYMPTOMS
PSYCHIATRIC NEGATIVE: 1
LIGHT-HEADEDNESS: 0
COUGH: 1
CONSTIPATION: 0
SINUS PRESSURE: 1
RHINORRHEA: 1
BACK PAIN: 0
AGITATION: 0
FEVER: 0
NEUROLOGICAL NEGATIVE: 1
HEMATOLOGIC/LYMPHATIC NEGATIVE: 1
CONFUSION: 0
JOINT SWELLING: 0
ABDOMINAL PAIN: 0
NAUSEA: 0
GASTROINTESTINAL NEGATIVE: 1
SHORTNESS OF BREATH: 0
CONSTITUTIONAL NEGATIVE: 1
PALPITATIONS: 0
VOMITING: 0
ABDOMINAL DISTENTION: 0
CARDIOVASCULAR NEGATIVE: 1
DIARRHEA: 0
EYES NEGATIVE: 1
CHILLS: 0
MUSCULOSKELETAL NEGATIVE: 1
ALLERGIC/IMMUNOLOGIC NEGATIVE: 1
HEADACHES: 0
NECK STIFFNESS: 0
EYE DISCHARGE: 0
ADENOPATHY: 0
DYSURIA: 0
ENDOCRINE NEGATIVE: 1
NUMBNESS: 0
WHEEZING: 0

## 2025-01-06 NOTE — PROGRESS NOTES
Subjective   Patient ID: Monica Rosales is a 79 y.o. female who presents for Follow-up (4 mo fu labs pt co sinus congestion with some cough).  Here for fu with blabs    Sinus congestion x 1 week, PND cough        Review of Systems   Constitutional: Negative.  Negative for chills and fever.   HENT:  Positive for postnasal drip, rhinorrhea and sinus pressure. Negative for congestion.    Eyes: Negative.  Negative for discharge.   Respiratory:  Positive for cough. Negative for shortness of breath and wheezing.    Cardiovascular: Negative.  Negative for chest pain, palpitations and leg swelling.   Gastrointestinal: Negative.  Negative for abdominal distention, abdominal pain, constipation, diarrhea, nausea and vomiting.   Endocrine: Negative.    Genitourinary: Negative.  Negative for dysuria and urgency.   Musculoskeletal: Negative.  Negative for back pain, joint swelling and neck stiffness.   Skin: Negative.  Negative for rash.   Allergic/Immunologic: Negative.  Negative for immunocompromised state.   Neurological: Negative.  Negative for light-headedness, numbness and headaches.   Hematological: Negative.  Negative for adenopathy.   Psychiatric/Behavioral: Negative.  Negative for agitation, behavioral problems and confusion.    All other systems reviewed and are negative.      Objective   Physical Exam  Vitals reviewed.   Constitutional:       General: She is not in acute distress.     Appearance: Normal appearance.   HENT:      Head: Normocephalic and atraumatic.      Nose: Congestion and rhinorrhea present.      Comments: Tenderness maxi sinuses  Eyes:      Conjunctiva/sclera: Conjunctivae normal.      Pupils: Pupils are equal, round, and reactive to light.   Neck:      Vascular: No carotid bruit.   Cardiovascular:      Rate and Rhythm: Normal rate and regular rhythm.      Pulses: Normal pulses.      Heart sounds:      No gallop.   Pulmonary:      Effort: Pulmonary effort is normal. No respiratory distress.       "Breath sounds: Normal breath sounds. No wheezing.   Abdominal:      General: Bowel sounds are normal.      Palpations: Abdomen is soft.      Tenderness: There is no abdominal tenderness.   Musculoskeletal:         General: Normal range of motion.      Cervical back: Normal range of motion. No rigidity.   Lymphadenopathy:      Cervical: No cervical adenopathy.   Skin:     General: Skin is warm.      Findings: No rash.   Neurological:      General: No focal deficit present.      Mental Status: She is alert and oriented to person, place, and time.   Psychiatric:         Mood and Affect: Mood normal.         Behavior: Behavior normal.       BP (!) 133/95 (BP Location: Right arm, Patient Position: Sitting)   Pulse 102   Ht 1.6 m (5' 3\")   Wt 79.4 kg (175 lb)   BMI 31.00 kg/m²    Hemoglobin A1C   Date/Time Value Ref Range Status   12/27/2024 08:22 AM 5.5 See comment % Final     Assessment/Plan   Problem List Items Addressed This Visit       DM2 (diabetes mellitus, type 2) (Multi) - Primary    Relevant Orders    Comprehensive Metabolic Panel    Hemoglobin A1C    HTN (hypertension)    Relevant Orders    Comprehensive Metabolic Panel    Hypercholesterolemia    Relevant Orders    Comprehensive Metabolic Panel    Lipid Panel    Hypertriglyceridemia    Relevant Orders    Comprehensive Metabolic Panel    Stage 3 chronic kidney disease (Multi)    Relevant Orders    Comprehensive Metabolic Panel    Vitamin D 25-Hydroxy,Total (for eval of Vitamin D levels)     Other Visit Diagnoses       Acute sinusitis, recurrence not specified, unspecified location        Relevant Medications    azithromycin (Zithromax) 250 mg tablet    Other Relevant Orders    Comprehensive Metabolic Panel    Post-menopausal        Relevant Orders    XR DEXA bone density             Diabetes Mellitus/IFG addressed as follow:    1800 DEEPAK ADA  HGA1C GOAL LESS THAN 7  LOSE WT  EXERCISE DAILY      HTN addressed as follow:    MONITOR BP   GOAL BP LOWER THAN " 130/80  LOW SALT  EXERCISE DAILY        Chronic kidney disease addressed as follow:    AVOID NSAIDS  INCREASE FLUID INTAKE      Fu 6 mo bw

## 2025-01-22 DIAGNOSIS — E78.00 HYPERCHOLESTEREMIA: ICD-10-CM

## 2025-01-22 DIAGNOSIS — I10 PRIMARY HYPERTENSION: ICD-10-CM

## 2025-01-22 DIAGNOSIS — M81.0 OSTEOPOROSIS, UNSPECIFIED OSTEOPOROSIS TYPE, UNSPECIFIED PATHOLOGICAL FRACTURE PRESENCE: ICD-10-CM

## 2025-01-22 DIAGNOSIS — E11.42 DIABETIC POLYNEUROPATHY ASSOCIATED WITH TYPE 2 DIABETES MELLITUS (MULTI): ICD-10-CM

## 2025-01-22 RX ORDER — ALENDRONATE SODIUM 70 MG/1
70 TABLET ORAL
Qty: 12 TABLET | Refills: 3 | Status: SHIPPED | OUTPATIENT
Start: 2025-01-22

## 2025-01-22 RX ORDER — DULOXETIN HYDROCHLORIDE 30 MG/1
30 CAPSULE, DELAYED RELEASE ORAL DAILY
Qty: 90 CAPSULE | Refills: 3 | Status: SHIPPED | OUTPATIENT
Start: 2025-01-22 | End: 2026-01-22

## 2025-01-22 RX ORDER — ATORVASTATIN CALCIUM 10 MG/1
10 TABLET, FILM COATED ORAL NIGHTLY
Qty: 90 TABLET | Refills: 3 | Status: SHIPPED | OUTPATIENT
Start: 2025-01-22

## 2025-01-22 RX ORDER — LOSARTAN POTASSIUM 100 MG/1
100 TABLET ORAL DAILY
Qty: 90 TABLET | Refills: 3 | Status: SHIPPED | OUTPATIENT
Start: 2025-01-22

## 2025-01-22 RX ORDER — AMLODIPINE BESYLATE 5 MG/1
5 TABLET ORAL DAILY
Qty: 90 TABLET | Refills: 3 | Status: SHIPPED | OUTPATIENT
Start: 2025-01-22

## 2025-02-21 ENCOUNTER — LAB REQUISITION (OUTPATIENT)
Dept: LAB | Facility: HOSPITAL | Age: 80
End: 2025-02-21

## 2025-02-21 ENCOUNTER — LAB (OUTPATIENT)
Dept: LAB | Facility: HOSPITAL | Age: 80
End: 2025-02-21
Payer: MEDICARE

## 2025-02-21 DIAGNOSIS — C43.39 MALIGNANT MELANOMA OF OTHER PARTS OF FACE (MULTI): ICD-10-CM

## 2025-02-21 DIAGNOSIS — R52 PAIN, UNSPECIFIED: ICD-10-CM

## 2025-02-21 DIAGNOSIS — C44.42 SQUAMOUS CELL CARCINOMA OF SKIN OF SCALP AND NECK: ICD-10-CM

## 2025-02-21 LAB
ALBUMIN SERPL BCP-MCNC: 4.1 G/DL (ref 3.4–5)
ALP SERPL-CCNC: 57 U/L (ref 33–136)
ALT SERPL W P-5'-P-CCNC: 20 U/L (ref 7–45)
ANION GAP SERPL CALC-SCNC: 11 MMOL/L (ref 10–20)
AST SERPL W P-5'-P-CCNC: 23 U/L (ref 9–39)
BILIRUB SERPL-MCNC: 0.7 MG/DL (ref 0–1.2)
BUN SERPL-MCNC: 16 MG/DL (ref 6–23)
CALCIUM SERPL-MCNC: 9.5 MG/DL (ref 8.6–10.3)
CHLORIDE SERPL-SCNC: 103 MMOL/L (ref 98–107)
CO2 SERPL-SCNC: 27 MMOL/L (ref 21–32)
CREAT SERPL-MCNC: 0.99 MG/DL (ref 0.5–1.05)
EGFRCR SERPLBLD CKD-EPI 2021: 58 ML/MIN/1.73M*2
GLUCOSE SERPL-MCNC: 101 MG/DL (ref 74–99)
POTASSIUM SERPL-SCNC: 4.2 MMOL/L (ref 3.5–5.3)
PROT SERPL-MCNC: 6.4 G/DL (ref 6.4–8.2)
SODIUM SERPL-SCNC: 137 MMOL/L (ref 136–145)

## 2025-02-21 PROCEDURE — 80053 COMPREHEN METABOLIC PANEL: CPT

## 2025-02-24 ENCOUNTER — HOSPITAL ENCOUNTER (OUTPATIENT)
Dept: RADIOLOGY | Facility: HOSPITAL | Age: 80
Discharge: HOME | End: 2025-02-24
Payer: MEDICARE

## 2025-02-24 DIAGNOSIS — C43.39 MALIGNANT MELANOMA OF OTHER PARTS OF FACE (MULTI): ICD-10-CM

## 2025-02-24 DIAGNOSIS — C44.42 SQUAMOUS CELL CARCINOMA OF SKIN OF SCALP AND NECK: ICD-10-CM

## 2025-02-24 DIAGNOSIS — R52 PAIN, UNSPECIFIED: ICD-10-CM

## 2025-02-24 PROCEDURE — 70460 CT HEAD/BRAIN W/DYE: CPT

## 2025-02-24 PROCEDURE — 2550000001 HC RX 255 CONTRASTS

## 2025-02-24 PROCEDURE — 70491 CT SOFT TISSUE NECK W/DYE: CPT

## 2025-02-24 RX ADMIN — IOHEXOL 68 ML: 350 INJECTION, SOLUTION INTRAVENOUS at 13:45

## 2025-02-25 ENCOUNTER — APPOINTMENT (OUTPATIENT)
Dept: RADIOLOGY | Facility: HOSPITAL | Age: 80
End: 2025-02-25
Payer: MEDICARE

## 2025-03-03 ENCOUNTER — HOSPITAL ENCOUNTER (OUTPATIENT)
Dept: RADIOLOGY | Facility: CLINIC | Age: 80
Discharge: HOME | End: 2025-03-03
Payer: MEDICARE

## 2025-03-03 DIAGNOSIS — Z78.0 POST-MENOPAUSAL: ICD-10-CM

## 2025-03-03 PROCEDURE — 77080 DXA BONE DENSITY AXIAL: CPT

## 2025-04-14 ENCOUNTER — OFFICE VISIT (OUTPATIENT)
Dept: HEMATOLOGY/ONCOLOGY | Facility: CLINIC | Age: 80
End: 2025-04-14
Payer: MEDICARE

## 2025-04-14 VITALS
OXYGEN SATURATION: 95 % | TEMPERATURE: 96.4 F | HEART RATE: 88 BPM | SYSTOLIC BLOOD PRESSURE: 128 MMHG | DIASTOLIC BLOOD PRESSURE: 89 MMHG | HEIGHT: 62 IN | WEIGHT: 180 LBS | BODY MASS INDEX: 33.13 KG/M2 | RESPIRATION RATE: 18 BRPM

## 2025-04-14 DIAGNOSIS — C44.42 SQUAMOUS CELL CARCINOMA OF SCALP: Primary | ICD-10-CM

## 2025-04-14 LAB
ALBUMIN SERPL BCP-MCNC: 4.4 G/DL (ref 3.4–5)
ALP SERPL-CCNC: 59 U/L (ref 33–136)
ALT SERPL W P-5'-P-CCNC: 22 U/L (ref 7–45)
ANION GAP SERPL CALC-SCNC: 12 MMOL/L (ref 10–20)
AST SERPL W P-5'-P-CCNC: 23 U/L (ref 9–39)
BASOPHILS # BLD AUTO: 0.05 X10*3/UL (ref 0–0.1)
BASOPHILS NFR BLD AUTO: 0.4 %
BILIRUB SERPL-MCNC: 0.5 MG/DL (ref 0–1.2)
BUN SERPL-MCNC: 17 MG/DL (ref 6–23)
CALCIUM SERPL-MCNC: 9.9 MG/DL (ref 8.6–10.3)
CHLORIDE SERPL-SCNC: 104 MMOL/L (ref 98–107)
CO2 SERPL-SCNC: 27 MMOL/L (ref 21–32)
CREAT SERPL-MCNC: 1.01 MG/DL (ref 0.5–1.05)
EGFRCR SERPLBLD CKD-EPI 2021: 56 ML/MIN/1.73M*2
EOSINOPHIL # BLD AUTO: 0.07 X10*3/UL (ref 0–0.4)
EOSINOPHIL NFR BLD AUTO: 0.5 %
ERYTHROCYTE [DISTWIDTH] IN BLOOD BY AUTOMATED COUNT: 13 % (ref 11.5–14.5)
GLUCOSE SERPL-MCNC: 99 MG/DL (ref 74–99)
HCT VFR BLD AUTO: 44.8 % (ref 36–46)
HGB BLD-MCNC: 14.4 G/DL (ref 12–16)
IMM GRANULOCYTES # BLD AUTO: 0.04 X10*3/UL (ref 0–0.5)
IMM GRANULOCYTES NFR BLD AUTO: 0.3 % (ref 0–0.9)
LDH SERPL L TO P-CCNC: 141 U/L (ref 84–246)
LYMPHOCYTES # BLD AUTO: 3.27 X10*3/UL (ref 0.8–3)
LYMPHOCYTES NFR BLD AUTO: 25 %
MCH RBC QN AUTO: 31.4 PG (ref 26–34)
MCHC RBC AUTO-ENTMCNC: 32.1 G/DL (ref 32–36)
MCV RBC AUTO: 98 FL (ref 80–100)
MONOCYTES # BLD AUTO: 1.09 X10*3/UL (ref 0.05–0.8)
MONOCYTES NFR BLD AUTO: 8.3 %
NEUTROPHILS # BLD AUTO: 8.54 X10*3/UL (ref 1.6–5.5)
NEUTROPHILS NFR BLD AUTO: 65.5 %
NRBC BLD-RTO: 0 /100 WBCS (ref 0–0)
PLATELET # BLD AUTO: 430 X10*3/UL (ref 150–450)
POTASSIUM SERPL-SCNC: 4.1 MMOL/L (ref 3.5–5.3)
PROT SERPL-MCNC: 6.9 G/DL (ref 6.4–8.2)
RBC # BLD AUTO: 4.59 X10*6/UL (ref 4–5.2)
SODIUM SERPL-SCNC: 139 MMOL/L (ref 136–145)
TSH SERPL-ACNC: 2.01 MIU/L (ref 0.44–3.98)
WBC # BLD AUTO: 13.1 X10*3/UL (ref 4.4–11.3)

## 2025-04-14 PROCEDURE — 3074F SYST BP LT 130 MM HG: CPT | Performed by: INTERNAL MEDICINE

## 2025-04-14 PROCEDURE — 1157F ADVNC CARE PLAN IN RCRD: CPT | Performed by: INTERNAL MEDICINE

## 2025-04-14 PROCEDURE — 84443 ASSAY THYROID STIM HORMONE: CPT | Performed by: INTERNAL MEDICINE

## 2025-04-14 PROCEDURE — 83615 LACTATE (LD) (LDH) ENZYME: CPT | Performed by: INTERNAL MEDICINE

## 2025-04-14 PROCEDURE — 82533 TOTAL CORTISOL: CPT | Mod: SAMLAB | Performed by: INTERNAL MEDICINE

## 2025-04-14 PROCEDURE — 1126F AMNT PAIN NOTED NONE PRSNT: CPT | Performed by: INTERNAL MEDICINE

## 2025-04-14 PROCEDURE — 99205 OFFICE O/P NEW HI 60 MIN: CPT | Performed by: INTERNAL MEDICINE

## 2025-04-14 PROCEDURE — 99215 OFFICE O/P EST HI 40 MIN: CPT | Mod: 25 | Performed by: INTERNAL MEDICINE

## 2025-04-14 PROCEDURE — 1159F MED LIST DOCD IN RCRD: CPT | Performed by: INTERNAL MEDICINE

## 2025-04-14 PROCEDURE — 86706 HEP B SURFACE ANTIBODY: CPT | Mod: SAMLAB | Performed by: INTERNAL MEDICINE

## 2025-04-14 PROCEDURE — 84075 ASSAY ALKALINE PHOSPHATASE: CPT | Performed by: INTERNAL MEDICINE

## 2025-04-14 PROCEDURE — 87340 HEPATITIS B SURFACE AG IA: CPT | Mod: SAMLAB | Performed by: INTERNAL MEDICINE

## 2025-04-14 PROCEDURE — 86704 HEP B CORE ANTIBODY TOTAL: CPT | Mod: SAMLAB | Performed by: INTERNAL MEDICINE

## 2025-04-14 PROCEDURE — 36415 COLL VENOUS BLD VENIPUNCTURE: CPT

## 2025-04-14 PROCEDURE — 82024 ASSAY OF ACTH: CPT | Performed by: INTERNAL MEDICINE

## 2025-04-14 PROCEDURE — 85025 COMPLETE CBC W/AUTO DIFF WBC: CPT | Performed by: INTERNAL MEDICINE

## 2025-04-14 PROCEDURE — 1123F ACP DISCUSS/DSCN MKR DOCD: CPT | Performed by: INTERNAL MEDICINE

## 2025-04-14 PROCEDURE — 3079F DIAST BP 80-89 MM HG: CPT | Performed by: INTERNAL MEDICINE

## 2025-04-14 RX ORDER — NICOTINAMIDE 100; 2; 850; 750; 50; 27 UG/1; MG/1; UG/1; MG/1; UG/1; MG/1
TABLET ORAL 2 TIMES DAILY
COMMUNITY

## 2025-04-14 ASSESSMENT — ENCOUNTER SYMPTOMS
DIARRHEA: 0
HEADACHES: 0
UNEXPECTED WEIGHT CHANGE: 0
TROUBLE SWALLOWING: 0
CONSTIPATION: 0
LIGHT-HEADEDNESS: 0
DYSURIA: 0
BRUISES/BLEEDS EASILY: 0
NERVOUS/ANXIOUS: 0
NUMBNESS: 0
ARTHRALGIAS: 1
WEAKNESS: 0
JOINT SWELLING: 1
SHORTNESS OF BREATH: 0
FREQUENCY: 0
FATIGUE: 1
VOMITING: 0
SLEEP DISTURBANCE: 0
WOUND: 0
APPETITE CHANGE: 0
ABDOMINAL PAIN: 0
COUGH: 0
NAUSEA: 0
ADENOPATHY: 0

## 2025-04-14 ASSESSMENT — COLUMBIA-SUICIDE SEVERITY RATING SCALE - C-SSRS
2. HAVE YOU ACTUALLY HAD ANY THOUGHTS OF KILLING YOURSELF?: NO
1. IN THE PAST MONTH, HAVE YOU WISHED YOU WERE DEAD OR WISHED YOU COULD GO TO SLEEP AND NOT WAKE UP?: NO
6. HAVE YOU EVER DONE ANYTHING, STARTED TO DO ANYTHING, OR PREPARED TO DO ANYTHING TO END YOUR LIFE?: NO

## 2025-04-14 ASSESSMENT — PAIN SCALES - GENERAL: PAINLEVEL_OUTOF10: 0-NO PAIN

## 2025-04-14 NOTE — PROGRESS NOTES
Pt scored a 0/10 on her distress survey today- no referrals at this time.  Tx plan labs drawn at visit today- no repeat labs needed before tx- per MD  Chemo consent signed today  Chemo class 4/24 230- dtr has to leave work to bring pt  Rtc 4/25 8am MD- first dose of Cemiplimab to follow  Drug handouts given to pt and dtr  Reviewed AVS with patient- patient verbalizes understanding

## 2025-04-14 NOTE — PATIENT INSTRUCTIONS
Reviewed labs and recent medical history.  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  Reviewed the options for treatment that were given to her by the oncologist from [facility].  Discussed the medication, Libtayo for her treatment and consents signed. Drug information provided.  Will plan on Chemotherapy teaching and will be scheduled by infusion.  Will have her get the labs drawn today.  Return to start treat in next week to 10 days. See MD this day as well.

## 2025-04-15 LAB
CORTIS AM PEAK SERPL-MSCNC: 10.6 UG/DL (ref 5–20)
HBV CORE AB SER QL: NONREACTIVE
HBV SURFACE AB SER-ACNC: <3.1 MIU/ML
HBV SURFACE AG SERPL QL IA: NONREACTIVE

## 2025-04-16 LAB — ACTH PLAS-MCNC: 14.5 PG/ML (ref 7.2–63.3)

## 2025-04-22 RX ORDER — HEPARIN 100 UNIT/ML
500 SYRINGE INTRAVENOUS AS NEEDED
Status: CANCELLED | OUTPATIENT
Start: 2025-04-22

## 2025-04-22 RX ORDER — HEPARIN SODIUM,PORCINE/PF 10 UNIT/ML
50 SYRINGE (ML) INTRAVENOUS AS NEEDED
Status: CANCELLED | OUTPATIENT
Start: 2025-04-22

## 2025-04-24 ENCOUNTER — INFUSION (OUTPATIENT)
Dept: HEMATOLOGY/ONCOLOGY | Facility: CLINIC | Age: 80
End: 2025-04-24
Payer: MEDICARE

## 2025-04-24 VITALS
HEART RATE: 82 BPM | DIASTOLIC BLOOD PRESSURE: 69 MMHG | RESPIRATION RATE: 18 BRPM | SYSTOLIC BLOOD PRESSURE: 132 MMHG | TEMPERATURE: 96.8 F | OXYGEN SATURATION: 95 %

## 2025-04-24 DIAGNOSIS — C44.42 SQUAMOUS CELL CARCINOMA OF SCALP: ICD-10-CM

## 2025-04-24 ASSESSMENT — PAIN SCALES - GENERAL: PAINLEVEL_OUTOF10: 0-NO PAIN

## 2025-04-25 ENCOUNTER — INFUSION (OUTPATIENT)
Dept: HEMATOLOGY/ONCOLOGY | Facility: CLINIC | Age: 80
End: 2025-04-25
Payer: MEDICARE

## 2025-04-25 ENCOUNTER — OFFICE VISIT (OUTPATIENT)
Dept: HEMATOLOGY/ONCOLOGY | Facility: CLINIC | Age: 80
End: 2025-04-25
Payer: MEDICARE

## 2025-04-25 VITALS
TEMPERATURE: 97.2 F | WEIGHT: 178.8 LBS | DIASTOLIC BLOOD PRESSURE: 75 MMHG | BODY MASS INDEX: 32.99 KG/M2 | RESPIRATION RATE: 16 BRPM | SYSTOLIC BLOOD PRESSURE: 115 MMHG | HEART RATE: 78 BPM | OXYGEN SATURATION: 96 %

## 2025-04-25 DIAGNOSIS — C44.42 SQUAMOUS CELL CARCINOMA OF SCALP: ICD-10-CM

## 2025-04-25 DIAGNOSIS — C44.42 SQUAMOUS CELL CARCINOMA OF SCALP: Primary | ICD-10-CM

## 2025-04-25 PROCEDURE — 3074F SYST BP LT 130 MM HG: CPT | Performed by: INTERNAL MEDICINE

## 2025-04-25 PROCEDURE — 1123F ACP DISCUSS/DSCN MKR DOCD: CPT | Performed by: INTERNAL MEDICINE

## 2025-04-25 PROCEDURE — 2500000004 HC RX 250 GENERAL PHARMACY W/ HCPCS (ALT 636 FOR OP/ED): Mod: JZ | Performed by: INTERNAL MEDICINE

## 2025-04-25 PROCEDURE — 99213 OFFICE O/P EST LOW 20 MIN: CPT | Mod: 25 | Performed by: INTERNAL MEDICINE

## 2025-04-25 PROCEDURE — 1126F AMNT PAIN NOTED NONE PRSNT: CPT | Performed by: INTERNAL MEDICINE

## 2025-04-25 PROCEDURE — 3078F DIAST BP <80 MM HG: CPT | Performed by: INTERNAL MEDICINE

## 2025-04-25 PROCEDURE — 96413 CHEMO IV INFUSION 1 HR: CPT

## 2025-04-25 PROCEDURE — 1157F ADVNC CARE PLAN IN RCRD: CPT | Performed by: INTERNAL MEDICINE

## 2025-04-25 PROCEDURE — 99213 OFFICE O/P EST LOW 20 MIN: CPT | Performed by: INTERNAL MEDICINE

## 2025-04-25 PROCEDURE — 1159F MED LIST DOCD IN RCRD: CPT | Performed by: INTERNAL MEDICINE

## 2025-04-25 RX ORDER — PROCHLORPERAZINE EDISYLATE 5 MG/ML
10 INJECTION INTRAMUSCULAR; INTRAVENOUS EVERY 6 HOURS PRN
Status: CANCELLED | OUTPATIENT
Start: 2025-04-25

## 2025-04-25 RX ORDER — PROCHLORPERAZINE MALEATE 10 MG
10 TABLET ORAL EVERY 6 HOURS PRN
Qty: 30 TABLET | Refills: 3 | Status: SHIPPED | OUTPATIENT
Start: 2025-04-25 | End: 2025-06-24

## 2025-04-25 RX ORDER — HEPARIN SODIUM,PORCINE/PF 10 UNIT/ML
50 SYRINGE (ML) INTRAVENOUS AS NEEDED
OUTPATIENT
Start: 2025-04-25

## 2025-04-25 RX ORDER — DIPHENHYDRAMINE HYDROCHLORIDE 50 MG/ML
50 INJECTION, SOLUTION INTRAMUSCULAR; INTRAVENOUS AS NEEDED
Status: CANCELLED | OUTPATIENT
Start: 2025-04-25

## 2025-04-25 RX ORDER — ONDANSETRON HYDROCHLORIDE 8 MG/1
8 TABLET, FILM COATED ORAL 2 TIMES DAILY PRN
Qty: 20 TABLET | Refills: 1 | Status: SHIPPED | OUTPATIENT
Start: 2025-04-25

## 2025-04-25 RX ORDER — FAMOTIDINE 10 MG/ML
20 INJECTION, SOLUTION INTRAVENOUS ONCE AS NEEDED
Status: CANCELLED | OUTPATIENT
Start: 2025-04-25

## 2025-04-25 RX ORDER — PROCHLORPERAZINE MALEATE 5 MG
10 TABLET ORAL EVERY 6 HOURS PRN
Status: CANCELLED | OUTPATIENT
Start: 2025-04-25

## 2025-04-25 RX ORDER — ALBUTEROL SULFATE 0.83 MG/ML
3 SOLUTION RESPIRATORY (INHALATION) AS NEEDED
Status: CANCELLED | OUTPATIENT
Start: 2025-04-25

## 2025-04-25 RX ORDER — EPINEPHRINE 0.3 MG/.3ML
0.3 INJECTION SUBCUTANEOUS EVERY 5 MIN PRN
Status: CANCELLED | OUTPATIENT
Start: 2025-04-25

## 2025-04-25 RX ORDER — HEPARIN 100 UNIT/ML
500 SYRINGE INTRAVENOUS AS NEEDED
OUTPATIENT
Start: 2025-04-25

## 2025-04-25 RX ADMIN — CEMIPLIMAB-RWLC 350 MG: 50 INJECTION INTRAVENOUS at 09:18

## 2025-04-25 ASSESSMENT — ENCOUNTER SYMPTOMS
DIARRHEA: 0
LIGHT-HEADEDNESS: 0
SHORTNESS OF BREATH: 0
ABDOMINAL PAIN: 0
ADENOPATHY: 0
HEADACHES: 0
NERVOUS/ANXIOUS: 0
TROUBLE SWALLOWING: 0
NAUSEA: 0
JOINT SWELLING: 1
NUMBNESS: 0
SLEEP DISTURBANCE: 0
BRUISES/BLEEDS EASILY: 0
FREQUENCY: 0
APPETITE CHANGE: 0
WEAKNESS: 0
CONSTIPATION: 0
DYSURIA: 0
VOMITING: 0
UNEXPECTED WEIGHT CHANGE: 0
FATIGUE: 1
WOUND: 0
ARTHRALGIAS: 1
COUGH: 0

## 2025-04-25 ASSESSMENT — PAIN SCALES - GENERAL: PAINLEVEL_OUTOF10: 0-NO PAIN

## 2025-04-25 NOTE — PROGRESS NOTES
Patient ID:Monica Rosales is a 79 y.o. 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 Rosales has been found to have rapidly growing squamous cell carcinoma of the vertex of the skull.  She had another one close by that was surgically excised.  This lesion continues to grow.  She was seen by Dr. Cinthia Fong the [facility] who recommended treatment with cemiplimab.  She is here to get treatment closer to home.  The melanoma of the left cheek of her face has been resected twice with positive margins.  It is in situ and margins are positive.    Chronological History  11/5/2024.  Saw Dr. Hampton in follow-up of head trauma after a box falling on her face.  He described a hyperpigmented lesion on her face and referred her to dermatology.    12/17/2024.  Saw Dr. Hampton for a scalp lesion that would not heal.  She said that the lesion had been present for couple months and was not painful.  She said that she put cream on it but it did not respond.  She said it started out tiny but very fast.  He described as a nodular skin lesion with central erosion 1 inch in diameter.  He referred her to plastic surgery.  She was referred see the dermatologist on 1/8/2025 at [facility].    1/6/2025.  Saw Dr. Hampton in follow-up for sinusitis and was given a prescription for oral antibiotics with azithromycin.    1/20/2025.  She had surgery that [facility] including an excision of her left cheek medially x 2 and a shave biopsy of the central occipital scalp.  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    Maximum tumor thickness was 0.1 mm.  No mitoses were seen and there was no lymphovascular invasion.  Tumor infiltrating lymphocytes were seen but nonbrisk.  There was no evidence of invasive melanoma.  No lymph nodes were submitted or found.  This is a pT1a lesion.    The lesion on the central scalp is invasive squamous cell carcinoma well-differentiated and was at least 1.7 cm with a depth of invasion of at least 6 mm.  Perineural invasion was not identified.  The deep and peripheral margins were extensively positive for carcinoma.    2/24/2025 CAT scans of the head and soft tissues of the neck showed no parenchymal or leptomeningeal enhancement.  There is an enhancing lobulated soft tissue lesion in the right parietal scalp at the vertex measuring 2.6 x 2.5 x 1.0 which contacts the underlying calvarium without arlyn involvement.  There is no intracranial extension.    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  Retropharyngeal and prevertebral soft tissues were normal.  There is no significant cervical adenopathy by size criteria but there were a few rounded nonspecific enhancing suboccipital lymph nodes measuring up to 0.6 cm on the right side.  Thyroid was unremarkable as were the parotids, submandibular glands, paranasal sinuses and mastoids.  There was evidence of bilateral lens replacements and degenerative change of the cervical spine was seen.  There is no evidence of high-grade spinal stenosis.    2/26/2025.  Dr. Sterling Padgett submitted another biopsy of the left medial skin of the cheek which showed residual melanoma in situ.  This was reviewed at the [facility] and is said to be a challenging case with features consistent with residual melanoma in situ.    2/28/2025.  [facility] was contacted from [place]  plastic surgery referred to Dr. Fong from Dr. Fernandez Stone for melanoma.    3/3/2025.  DEXA scan showed osteoporosis.    3/6/2025.  Surgical pathology on skin from left medial cheek showed residual melanoma in situ.  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  The provider was Margaret Young MD sending the specimen to [facility] for consultation.    3/31/2025.  Surgical pathology at [facility] reviewed the specimens from 1/20/2025 and those from the second biopsy.  The diagnoses do not appear to have been changed.    4/2/2025.  Patient was referred to Dr. Cinthia Fong at [facility].  She was referred to local oncology for Cemiplimab.  She was to return to [place] mid July with repeat CT scans.    4/14/2025.  She is here today with her daughter Domonique.  She says that the lesion on the top of her head continues to grow.  She says that sometimes there is a drainage from it.  It is usually not painful but it is annoying and sometimes it itches.  She says it feels as if there is a weight on her head.  She said that she cannot really cover it up with her hair.  She wonders if she can use hair products to cover it up better.  Her daughter encouraged her to just leave it alone.    She knows that she will undergo a series of intravenous therapy treatments and then will have a repeat CT scan and will return to see Dr. Fong mid July for further evaluation.    Mrs. Rosales [place]  She said she never wore a hat and never wore sunscreen.  She is blue-eyed fair skinned.  No one else in her family has had serious skin cancers.    She is otherwise a very vibrant woman.  She is able to perform all of her activities of daily living.  She does not have significant pain.  She has not lost any weight.  She is not aware of any adenopathy.  She has had no bleeding or infection.  She has aching pain from her arthritis.  She denies numbness and tingling.    She says that  she was told by someone that the Cemiplimab will take care of both the squamous cell carcinoma on her vertex of her scalp and also the melanoma on her cheek.  I told her that I did not think that that was the case.    Interval Note  4/14/2025.  Laboratory data showed that her white count was 13.1 hemoglobin 14.4 hematocrit 44.8 with a platelet count of 430,000 with a normal white blood cell differential.  CMP was normal except for an EGFR of 56.  Hepatitis B antigen and antibody were nonreactive as was the core antigen.  Cortisol was normal.  LDH is normal.  ACTH was normal.  TSH was normal.    4/25/2025.  She is here with her daughter to start her treatment.  She says that her appetite is good and her weight is stable.  She has no new lumps or bumps.  She is glad to get this started.  We reviewed some of the most common side effects of this treatment and she knows to ask for help and not to hide her symptoms.  We reviewed her normal laboratory data.  She has had no signs of infection and no signs of bleeding.  They had no new questions.  We said that her elevated white count is probably reactive.  We made sure that she had antinausea medicines available.  We will see her again in about 3 weeks.  She knows to call if she has any problems.    Comorbidities.  Hypertension  Macular degeneration  Tremor  History of C. difficile  History of 2 shoulder replacements  Hysterectomy  Cholecystectomy  Tonsillectomy and adenoidectomy  Osteoporosis  Peripheral neuropathy  Osteoarthritis  Urge incontinence  CKD, seen by Dr. Tomas after a bout her EL related to her surgery.  GERD  Aneurysm of the ascending aorta  Colon polyps  Hyperlipidemia  Fractured fingers 2016.  Chronic gastritis and gastric fundal polyps 2018  Diverticular disease  Stress test in 2022 showed normal perfusion and left ventricular ejection fraction estimated at greater than 65%    Monitoring Parameters  Histories and physical examinations CAT scans and PET  scans    Review of Systems - Oncology   Review of Systems   Constitutional:  Positive for fatigue. Negative for appetite change and unexpected weight change.   HENT:  Negative for dental problem, hearing loss, mouth sores and trouble swallowing.    Eyes:  Negative for visual disturbance.   Respiratory:  Negative for cough and shortness of breath.    Cardiovascular:  Negative for leg swelling.   Gastrointestinal:  Negative for abdominal pain, constipation, diarrhea, nausea and vomiting.   Endocrine: Negative for polyuria.   Genitourinary:  Negative for dysuria, frequency and urgency.   Musculoskeletal:  Positive for arthralgias and joint swelling.   Skin:  Positive for pallor. Negative for rash and wound.   Neurological:  Negative for weakness, light-headedness, numbness and headaches.   Hematological:  Negative for adenopathy. Does not bruise/bleed easily.   Psychiatric/Behavioral:  Negative for self-injury, sleep disturbance and suicidal ideas. The patient is not nervous/anxious.    She has had a tremor for about a year.  She said it was similar to the tremor that her father had.  She has had a history of pneumonia several times but has not required hospitalization.  She says that her sleep is interrupted by the need to urinate.    Past Medical History  Past Medical History:   Diagnosis Date    Aneurysm of the ascending aorta, without rupture 03/01/2022    Aneurysm, ascending aorta    Chronic kidney disease, stage 2 (mild) 05/26/2020    Stage 2 chronic kidney disease    Personal history of other (healed) physical injury and trauma     History of injury of neck    Personal history of other diseases of the digestive system     History of gastroesophageal reflux (GERD)    Personal history of other infectious and parasitic diseases     History of Clostridioides difficile infection    Personal history of other medical treatment     H/O complete eye exam    Personal history of other medical treatment     History of  mammogram    Personal history of other medical treatment     H/O bone density study    Personal history of other medical treatment     History of bone scan        Surgical History  Past Surgical History:   Procedure Laterality Date    CATARACT EXTRACTION      CHOLECYSTECTOMY      COLONOSCOPY  03/23/2023    DR. PEREZ - REPEAT 3 YEARS. FINAL DIAGNOSIS A.  ASCENDING COLON POLYP: TUBULAR ADENOMA.  B.  TRANSVERSE COLON POLYP: TUBULAR ADENOMA.  C.  SIGMOID COLON POLYP: TUBULOVILLOUS ADENOMA.    ESOPHAGOGASTRODUODENOSCOPY  06/28/2018    HYSTERECTOMY  1990    KNEE SURGERY      TOTAL SHOULDER ARTHROPLASTY Left 12/29/2014   Nissen fundoplication and hiatal hernia repair 2019.    Social History  Social History     Tobacco Use    Smoking status: Never    Smokeless tobacco: Never   Vaping Use    Vaping status: Never Used   Substance Use Topics    Alcohol use: Yes     Comment: OCCASSIONALLY    Drug use: Never        Family History  family history includes CARDIAC DISORDER in her sister; Diabetes type II in an other family member; Lung cancer in her father; No Known Problems in her mother.       Current Medications  Current Outpatient Medications   Medication Instructions    alendronate (FOSAMAX) 70 mg, oral, Every 7 days    amLODIPine (NORVASC) 5 mg, oral, Daily    ascorbic acid (Vitamin C) 500 mg tablet 1 tablet, Daily    atorvastatin (LIPITOR) 10 mg, oral, Nightly    calcium carbonate-vitamin D3 600 mg-20 mcg (800 unit) tablet 1 tablet, 2 times daily    DULoxetine (CYMBALTA) 30 mg, oral, Daily    lolgsupkb-E3-ati-Ax-ffu-lozhq (Nicotinamide, with chromium,) 500 mcg- 750 mg tablet 2 times daily    loratadine (Claritin) 10 mg tablet 1 tablet, Daily PRN    losartan (COZAAR) 100 mg, oral, Daily    multivit-min-iron-FA-lutein (Centrum Silver Women) 8 mg iron-400 mcg-300 mcg tablet 1 tablet, Daily    vitamins A,C,E-zinc-copper (PreserVision AREDS) 2,148 mcg-113 mg-45 mg-17.4mg tablet 1 tablet, Daily           OARRS Review  I have  personally reviewed the OARRS report for Monica Rosales. I have considered the risks of abuse, dependence, addiction and diversion.  Review of this record shows that she has no prescriptions for sedatives stimulants or opioids.    Vital Signs  /75   Pulse 78   Temp 36.2 °C (97.2 °F) (Skin)   Resp 16   Wt 81.1 kg (178 lb 12.8 oz)   SpO2 96%   BMI 32.99 kg/m²      Physical Exam  Vitals and nursing note reviewed. Exam conducted with a chaperone present.   Constitutional:       General: She is not in acute distress.     Appearance: She is not ill-appearing or toxic-appearing.      Comments: She is a well-developed well-nourished elderly white female with a large knot on the top of her head.  She does not appear to be acutely or chronically ill.  She is accompanied by her daughter Domonique.  She is alert, pleasant and engaged in conversation.   HENT:      Head: Normocephalic and atraumatic.      Comments: She has a lump on the top of her head which is dry and reddish in color with minimal discharge at the base.  There is a posterior extension of the mass which is not as tall as the rest of the mass.  The tail of this posterior component tapers off to the left almost in a triangle whereas the largest part of the lesion is in the form of a cube     Nose: Nose normal.      Mouth/Throat:      Mouth: Mucous membranes are moist.      Pharynx: Oropharynx is clear. No oropharyngeal exudate or posterior oropharyngeal erythema.      Comments: She has an upper plate and she has 4 teeth in the bottom jaw 2 on each side.  Eyes:      General: No scleral icterus.     Extraocular Movements: Extraocular movements intact.      Conjunctiva/sclera: Conjunctivae normal.      Pupils: Pupils are equal, round, and reactive to light.   Neck:      Comments: She has a few palpable lymph nodes in the base of her left posterior cervical region that are about a centimeter.  Cardiovascular:      Rate and Rhythm: Normal rate and regular  rhythm.      Heart sounds: Murmur (systolic murmur) heard.   Pulmonary:      Effort: Pulmonary effort is normal. No respiratory distress.      Breath sounds: No wheezing, rhonchi or rales.   Abdominal:      General: There is no distension.      Palpations: Abdomen is soft. There is no mass.      Tenderness: There is no abdominal tenderness. There is no guarding or rebound.   Musculoskeletal:         General: Normal range of motion.      Cervical back: Normal range of motion. No rigidity or tenderness.      Right lower leg: No edema.      Left lower leg: No edema.   Lymphadenopathy:      Cervical: Cervical adenopathy (1cm size lymph node at base of neck. left) present.   Skin:     General: Skin is warm and dry.      Coloration: Skin is pale. Skin is not jaundiced.      Findings: Lesion present.      Comments: Patient has been described as 2.6 x 2.5 x 1.0 cm.  Posteriorly has an extension which is not as tall as the previous mass.  The top of the mass is somewhat white and scaly.  There is minimal discharge at the base.    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  It is hyperpigmented in an arc like shape and is minimally palpable.  Measures today across the top is 4.5 cm x 4.5 cm   Neurological:      Mental Status: She is alert and oriented to person, place, and time. Mental status is at baseline.      Sensory: Sensory deficit present.      Motor: No weakness.      Gait: Gait normal.      Comments: Somewhat hard of hearing.  She is able to get onto the examination table without any difficulty.   Psychiatric:         Mood and Affect: Mood normal.         Behavior: Behavior normal.         Thought Content: Thought content normal.         Judgment: Judgment normal.      Comments: She seems to be coping well.        Current Laboratory Data  Laboratory data drawn today shows normal serum chemistries  with an EGFR of 56.  TSH was 2.01.  Cortisol and ACTH are normal.  CBC showed a white count of 13.1 with a hemoglobin of 14 hematocrit 44 and a platelet count of 430,000 with 65% polys 25 lymphs 8 monos.  Absolute neutrophil count of 8.54.  Hepatitis titers were negative    Recent Imaging  Imaging  See narrative for imaging    Cardiology, Vascular, and Other Imaging  No other imaging results found for the past 7 days        Pathology  Squamous cell carcinoma of the top of her head this is a well-differentiated invasive squamous cell carcinoma which invades beyond subcutaneous fat with extensively positive margins.    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    Performance Status:  Asymptomatic    Assessment/Plan    1.  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  She has been prescribed Cemiplimab which will be given every 3 weeks.  She is to see Dr. Fong again mid July.  Today we described the side effects of this medication.  She understands that open lines of communication are foster and that she should not keep any side effects to herself but complain as soon as things are apparent.  We will have baseline blood work drawn today.  Therapy plan was approved and we will start treatment today.    2.  Melanoma in situ.  The initial biopsies done on 1/20/2025 showed melanoma in situ in the medial skin of the left cheek with peripheral margin focally involved.  The deep margin appears to be uninvolved.    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    Rebiopsy of the skin from the left medial cheek showed residual melanoma in situ done 2/27/2025.  The margins were focally involved.    This patient may need further surgery on her melanoma on her face.    3.  Multiple comorbidities.  These include but are not limited to:   Hypertension  Macular degeneration  Tremor  History of C.  difficile  History of 2 shoulder replacements  Hysterectomy  Cholecystectomy  Tonsillectomy and adenoidectomy  Osteoporosis  Peripheral neuropathy  Osteoarthritis  Urge incontinence  CKD  GERD  Chronic gastritis and gastric fundal polyps 2018  Diverticular disease  Stress test in 2022 showed normal perfusion and left ventricular ejection fraction estimated at greater than 65%  Aneurysm of the ascending aorta  Colon polyps  Hyperlipidemia  Fractured fingers on the right side June 2016    We will start her treatment today.  She knows to call if she has any change in her status, questions or concerns.    Cathy Doshi MD

## 2025-04-25 NOTE — PROGRESS NOTES
Pt getting first tx of Cemiplimab today- consent signed  Pt to get labs at the hosp 5/15  Rtc 5/16 8am MD                9am Cemiplimab  Pt has office ph #- instructed her to call with any issues  Reviewed AVS with patient- patient verbalizes understanding

## 2025-04-25 NOTE — PATIENT INSTRUCTIONS
Reviewed labs and recent medical history.  Discussed her blood counts and noted that her white blood cell count is slightly elevated. Will continue to monitor.  Discussed anti nausea mediations and side Effects. Scripts sent to her pharmacy.   Okay for her treatment today, 4/25/25  Asked her to call with any questions or symptoms that she has after her treatment.  Return to see MD in 3 weeks for her next treatment.

## 2025-05-14 ENCOUNTER — LAB (OUTPATIENT)
Dept: LAB | Facility: HOSPITAL | Age: 80
End: 2025-05-14
Payer: MEDICARE

## 2025-05-14 DIAGNOSIS — C44.42 SQUAMOUS CELL CARCINOMA OF SCALP: ICD-10-CM

## 2025-05-14 LAB
ALBUMIN SERPL BCP-MCNC: 4 G/DL (ref 3.4–5)
ALP SERPL-CCNC: 53 U/L (ref 33–136)
ALT SERPL W P-5'-P-CCNC: 25 U/L (ref 7–45)
ANION GAP SERPL CALC-SCNC: 12 MMOL/L (ref 10–20)
AST SERPL W P-5'-P-CCNC: 28 U/L (ref 9–39)
BASOPHILS # BLD AUTO: 0.05 X10*3/UL (ref 0–0.1)
BASOPHILS NFR BLD AUTO: 0.6 %
BILIRUB SERPL-MCNC: 0.5 MG/DL (ref 0–1.2)
BUN SERPL-MCNC: 20 MG/DL (ref 6–23)
CALCIUM SERPL-MCNC: 9.2 MG/DL (ref 8.6–10.3)
CHLORIDE SERPL-SCNC: 103 MMOL/L (ref 98–107)
CO2 SERPL-SCNC: 27 MMOL/L (ref 21–32)
CREAT SERPL-MCNC: 1.09 MG/DL (ref 0.5–1.05)
EGFRCR SERPLBLD CKD-EPI 2021: 51 ML/MIN/1.73M*2
EOSINOPHIL # BLD AUTO: 0.08 X10*3/UL (ref 0–0.4)
EOSINOPHIL NFR BLD AUTO: 1 %
ERYTHROCYTE [DISTWIDTH] IN BLOOD BY AUTOMATED COUNT: 13.4 % (ref 11.5–14.5)
GLUCOSE SERPL-MCNC: 124 MG/DL (ref 74–99)
HCT VFR BLD AUTO: 42.9 % (ref 36–46)
HGB BLD-MCNC: 13.6 G/DL (ref 12–16)
IMM GRANULOCYTES # BLD AUTO: 0.02 X10*3/UL (ref 0–0.5)
IMM GRANULOCYTES NFR BLD AUTO: 0.2 % (ref 0–0.9)
LYMPHOCYTES # BLD AUTO: 2.39 X10*3/UL (ref 0.8–3)
LYMPHOCYTES NFR BLD AUTO: 28.6 %
MCH RBC QN AUTO: 31.5 PG (ref 26–34)
MCHC RBC AUTO-ENTMCNC: 31.7 G/DL (ref 32–36)
MCV RBC AUTO: 99 FL (ref 80–100)
MONOCYTES # BLD AUTO: 0.61 X10*3/UL (ref 0.05–0.8)
MONOCYTES NFR BLD AUTO: 7.3 %
NEUTROPHILS # BLD AUTO: 5.21 X10*3/UL (ref 1.6–5.5)
NEUTROPHILS NFR BLD AUTO: 62.3 %
NRBC BLD-RTO: 0 /100 WBCS (ref 0–0)
PLATELET # BLD AUTO: 460 X10*3/UL (ref 150–450)
POTASSIUM SERPL-SCNC: 4 MMOL/L (ref 3.5–5.3)
PROT SERPL-MCNC: 6.1 G/DL (ref 6.4–8.2)
RBC # BLD AUTO: 4.32 X10*6/UL (ref 4–5.2)
SODIUM SERPL-SCNC: 138 MMOL/L (ref 136–145)
WBC # BLD AUTO: 8.4 X10*3/UL (ref 4.4–11.3)

## 2025-05-14 PROCEDURE — 85025 COMPLETE CBC W/AUTO DIFF WBC: CPT

## 2025-05-14 PROCEDURE — 80053 COMPREHEN METABOLIC PANEL: CPT

## 2025-05-16 ENCOUNTER — OFFICE VISIT (OUTPATIENT)
Dept: HEMATOLOGY/ONCOLOGY | Facility: CLINIC | Age: 80
End: 2025-05-16
Payer: MEDICARE

## 2025-05-16 ENCOUNTER — INFUSION (OUTPATIENT)
Dept: HEMATOLOGY/ONCOLOGY | Facility: CLINIC | Age: 80
End: 2025-05-16
Payer: MEDICARE

## 2025-05-16 VITALS
WEIGHT: 180.2 LBS | BODY MASS INDEX: 33.25 KG/M2 | OXYGEN SATURATION: 95 % | RESPIRATION RATE: 16 BRPM | HEART RATE: 80 BPM | TEMPERATURE: 96.6 F | DIASTOLIC BLOOD PRESSURE: 77 MMHG | SYSTOLIC BLOOD PRESSURE: 114 MMHG

## 2025-05-16 VITALS — RESPIRATION RATE: 16 BRPM | HEART RATE: 59 BPM | SYSTOLIC BLOOD PRESSURE: 121 MMHG | DIASTOLIC BLOOD PRESSURE: 77 MMHG

## 2025-05-16 DIAGNOSIS — C44.42 SQUAMOUS CELL CARCINOMA OF SCALP: Primary | ICD-10-CM

## 2025-05-16 DIAGNOSIS — C44.42 SQUAMOUS CELL CARCINOMA OF SCALP: ICD-10-CM

## 2025-05-16 PROCEDURE — 3078F DIAST BP <80 MM HG: CPT | Performed by: INTERNAL MEDICINE

## 2025-05-16 PROCEDURE — 3074F SYST BP LT 130 MM HG: CPT | Performed by: INTERNAL MEDICINE

## 2025-05-16 PROCEDURE — 2500000004 HC RX 250 GENERAL PHARMACY W/ HCPCS (ALT 636 FOR OP/ED): Mod: JZ | Performed by: INTERNAL MEDICINE

## 2025-05-16 PROCEDURE — 96413 CHEMO IV INFUSION 1 HR: CPT

## 2025-05-16 PROCEDURE — 1159F MED LIST DOCD IN RCRD: CPT | Performed by: INTERNAL MEDICINE

## 2025-05-16 PROCEDURE — 99213 OFFICE O/P EST LOW 20 MIN: CPT | Performed by: INTERNAL MEDICINE

## 2025-05-16 PROCEDURE — 1126F AMNT PAIN NOTED NONE PRSNT: CPT | Performed by: INTERNAL MEDICINE

## 2025-05-16 RX ORDER — HEPARIN SODIUM,PORCINE/PF 10 UNIT/ML
50 SYRINGE (ML) INTRAVENOUS AS NEEDED
OUTPATIENT
Start: 2025-05-16

## 2025-05-16 RX ORDER — EPINEPHRINE 0.3 MG/.3ML
0.3 INJECTION SUBCUTANEOUS EVERY 5 MIN PRN
Status: CANCELLED | OUTPATIENT
Start: 2025-05-16

## 2025-05-16 RX ORDER — PROCHLORPERAZINE MALEATE 5 MG
10 TABLET ORAL EVERY 6 HOURS PRN
Status: CANCELLED | OUTPATIENT
Start: 2025-05-16

## 2025-05-16 RX ORDER — PROCHLORPERAZINE EDISYLATE 5 MG/ML
10 INJECTION INTRAMUSCULAR; INTRAVENOUS EVERY 6 HOURS PRN
Status: CANCELLED | OUTPATIENT
Start: 2025-05-16

## 2025-05-16 RX ORDER — DIPHENHYDRAMINE HYDROCHLORIDE 50 MG/ML
50 INJECTION, SOLUTION INTRAMUSCULAR; INTRAVENOUS AS NEEDED
Status: CANCELLED | OUTPATIENT
Start: 2025-05-16

## 2025-05-16 RX ORDER — HEPARIN 100 UNIT/ML
500 SYRINGE INTRAVENOUS AS NEEDED
OUTPATIENT
Start: 2025-05-16

## 2025-05-16 RX ORDER — FAMOTIDINE 10 MG/ML
20 INJECTION, SOLUTION INTRAVENOUS ONCE AS NEEDED
Status: CANCELLED | OUTPATIENT
Start: 2025-05-16

## 2025-05-16 RX ORDER — ALBUTEROL SULFATE 0.83 MG/ML
3 SOLUTION RESPIRATORY (INHALATION) AS NEEDED
Status: CANCELLED | OUTPATIENT
Start: 2025-05-16

## 2025-05-16 RX ADMIN — CEMIPLIMAB-RWLC 350 MG: 50 INJECTION INTRAVENOUS at 09:35

## 2025-05-16 ASSESSMENT — ENCOUNTER SYMPTOMS
VOMITING: 0
DIARRHEA: 0
BRUISES/BLEEDS EASILY: 0
SHORTNESS OF BREATH: 0
NUMBNESS: 0
ARTHRALGIAS: 1
ADENOPATHY: 0
TROUBLE SWALLOWING: 0
SLEEP DISTURBANCE: 0
FREQUENCY: 0
NAUSEA: 0
COUGH: 0
WEAKNESS: 0
LIGHT-HEADEDNESS: 0
CONSTIPATION: 0
APPETITE CHANGE: 0
JOINT SWELLING: 1
FATIGUE: 1
NERVOUS/ANXIOUS: 0
HEADACHES: 0
WOUND: 0
UNEXPECTED WEIGHT CHANGE: 0
DYSURIA: 0
ABDOMINAL PAIN: 0

## 2025-05-16 ASSESSMENT — PAIN SCALES - GENERAL: PAINLEVEL_OUTOF10: 0-NO PAIN

## 2025-05-16 NOTE — PATIENT INSTRUCTIONS
Reviewed labs and recent medical history.  Discussed with her the side effects again from the immunotherapy. She is tolerating well.  Okay for her treatment today, 5/16/25  Return to see MD in 3 weeks for follow up and next treatment.

## 2025-05-16 NOTE — PROGRESS NOTES
Pt getting her Cemiplimab today  Next tx labs 6/5 at the [facility]  Rtc 6/6 8am MD              9am Cemiplimab to follow  Reviewed AVS with patient- patient verbalizes understanding

## 2025-05-16 NOTE — PROGRESS NOTES
Patient ID:Monica Rosales is a 79 y.o. 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 Rosales has been found to have rapidly growing squamous cell carcinoma of the vertex of the skull.  She had another one close by that was surgically excised.  This lesion continues to grow.  She was seen by Dr. Cinthia Fong the [facility] who recommended treatment with cemiplimab.  She is here to get treatment closer to home.  The melanoma of the left cheek of her face has been resected twice with positive margins.  It is in situ and margins are positive.    Chronological History  11/5/2024.  Saw Dr. Hampton in follow-up of head trauma after a box falling on her face.  He described a hyperpigmented lesion on her face and referred her to dermatology.    12/17/2024.  Saw Dr. Hampton for a scalp lesion that would not heal.  She said that the lesion had been present for couple months and was not painful.  She said that she put cream on it but it did not respond.  She said it started out tiny but very fast.  He described as a nodular skin lesion with central erosion 1 inch in diameter.  He referred her to plastic surgery.  She was referred see the dermatologist on 1/8/2025 at [facility].    1/6/2025.  Saw Dr. Hampton in follow-up for sinusitis and was given a prescription for oral antibiotics with azithromycin.    1/20/2025.  She had surgery that [facility] including an excision of her left cheek medially x 2 and a shave biopsy of the central occipital scalp.  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    Maximum tumor thickness was 0.1 mm.  No mitoses were seen and there was no lymphovascular invasion.  Tumor infiltrating lymphocytes were seen but nonbrisk.  There was no evidence of invasive melanoma.  No lymph nodes were submitted or found.  This is a pT1a lesion.    The lesion on the central scalp is invasive squamous cell carcinoma well-differentiated and was at least 1.7 cm with a depth of invasion of at least 6 mm.  Perineural invasion was not identified.  The deep and peripheral margins were extensively positive for carcinoma.    2/24/2025 CAT scans of the head and soft tissues of the neck showed no parenchymal or leptomeningeal enhancement.  There is an enhancing lobulated soft tissue lesion in the right parietal scalp at the vertex measuring 2.6 x 2.5 x 1.0 which contacts the underlying calvarium without arlyn involvement.  There is no intracranial extension.    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  Retropharyngeal and prevertebral soft tissues were normal.  There is no significant cervical adenopathy by size criteria but there were a few rounded nonspecific enhancing suboccipital lymph nodes measuring up to 0.6 cm on the right side.  Thyroid was unremarkable as were the parotids, submandibular glands, paranasal sinuses and mastoids.  There was evidence of bilateral lens replacements and degenerative change of the cervical spine was seen.  There is no evidence of high-grade spinal stenosis.    2/26/2025.  Dr. Sterling Padgett submitted another biopsy of the left medial skin of the cheek which showed residual melanoma in situ.  This was reviewed at the [facility] and is said to be a challenging case with features consistent with residual melanoma in situ.    2/28/2025.  [facility] was contacted from [facility]  plastic surgery referred to Dr. Fong from Dr. Fernandez Stone for melanoma.    3/3/2025.  DEXA scan showed osteoporosis.    3/6/2025.  Surgical pathology on skin from left medial cheek showed residual melanoma in situ.  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  The provider was Margaret Young MD sending the specimen to [facility] for consultation.    3/31/2025.  Surgical pathology at [facility] reviewed the specimens from 1/20/2025 and those from the second biopsy.  The diagnoses do not appear to have been changed.    4/2/2025.  Patient was referred to Dr. Cinthia Fong at [facility].  She was referred to local oncology for Cemiplimab.  She was to return to [place] mid July with repeat CT scans.    4/14/2025.  She is here today with her daughter Domonique.  She says that the lesion on the top of her head continues to grow.  She says that sometimes there is a drainage from it.  It is usually not painful but it is annoying and sometimes it itches.  She says it feels as if there is a weight on her head.  She said that she cannot really cover it up with her hair.  She wonders if she can use hair products to cover it up better.  Her daughter encouraged her to just leave it alone.    She knows that she will undergo a series of intravenous therapy treatments and then will have a repeat CT scan and will return to see Dr. Fong mid July for further evaluation.    Mrs. Rosales says [place]  She said she never wore a hat and never wore sunscreen.  She is blue-eyed fair skinned.  No one else in her family has had serious skin cancers.    She is otherwise a very vibrant woman.  She is able to perform all of her activities of daily living.  She does not have significant pain.  She has not lost any weight.  She is not aware of any adenopathy.  She has had no bleeding or infection.  She has aching pain from her arthritis.  She denies numbness and tingling.    She says that  she was told by someone that the Cemiplimab will take care of both the squamous cell carcinoma on her vertex of her scalp and also the melanoma on her cheek.  I told her that I did not think that that was the case.    4/14/2025.  Laboratory data showed that her white count was 13.1 hemoglobin 14.4 hematocrit 44.8 with a platelet count of 430,000 with a normal white blood cell differential.  CMP was normal except for an EGFR of 56.  Hepatitis B antigen and antibody were nonreactive as was the core antigen.  Cortisol was normal.  LDH is normal.  ACTH was normal.  TSH was normal.    4/25/2025.  She is here with her daughter to start her treatment.  She says that her appetite is good and her weight is stable.  She has no new lumps or bumps.  She is glad to get this started.  We reviewed some of the most common side effects of this treatment and she knows to ask for help and not to hide her symptoms.  We reviewed her normal laboratory data.  She has had no signs of infection and no signs of bleeding.  They had no new questions.  We said that her elevated white count is probably reactive.  We made sure that she had antinausea medicines available.  We will see her again in about 3 weeks.  She knows to call if she has any problems.    Interval Note  5/16/2025.  She is here today for her next infusion of Cemiplimab.  She did well last time except for a rash that she had on her chest.  She has sent me a picture of it.  In retrospect she said that she had changed the dryer sheets that she used with her wash and she thinks that that might have been the cause of the rash.  The rash subsequently subsided without any significant treatment.  She had no other ill effects.    She said that the mass seems smaller she said that there is a separation of the mass on her head into 2 pieces.  She was encouraged not to pick at it.  She says that it is not painful.  She has had sweats but this is not a change she has had no fevers or chills  or bleeding.  She has had no diarrhea or change in her bowel habits.  Her appetite is good and her weight is stable.  She wondered if this would do anything for her melanoma on her face and I told her that this drug is not indicated for melanoma per se.    She is here today with her daughter.  Multiple questions were answered to her satisfaction.  They have an appointment to go back and see Dr. Fong at [facility] in July.  She will have her treatment today.  We will see her again in 3 weeks.  She was encouraged to call if she has any problems.    Comorbidities.  Hypertension  Macular degeneration  Tremor  History of C. difficile  History of 2 shoulder replacements  Hysterectomy  Cholecystectomy  Tonsillectomy and adenoidectomy  Osteoporosis  Peripheral neuropathy  Osteoarthritis  Urge incontinence  CKD, seen by Dr. Tomas after a bout her EL related to her surgery.  GERD  Aneurysm of the ascending aorta  Colon polyps  Hyperlipidemia  Fractured fingers 2016.  Chronic gastritis and gastric fundal polyps 2018  Diverticular disease  Stress test in 2022 showed normal perfusion and left ventricular ejection fraction estimated at greater than 65%    Monitoring Parameters  Histories and physical examinations CAT scans and PET scans    Review of Systems - Oncology   Review of Systems   Constitutional:  Positive for fatigue. Negative for appetite change and unexpected weight change.   HENT:  Negative for dental problem, hearing loss, mouth sores and trouble swallowing.    Eyes:  Negative for visual disturbance.   Respiratory:  Negative for cough and shortness of breath.    Cardiovascular:  Negative for leg swelling.   Gastrointestinal:  Negative for abdominal pain, constipation, diarrhea, nausea and vomiting.   Endocrine: Negative for polyuria.   Genitourinary:  Negative for dysuria, frequency and urgency.   Musculoskeletal:  Positive for arthralgias and joint swelling.   Skin:  Positive for pallor. Negative for rash and wound.    Neurological:  Negative for weakness, light-headedness, numbness and headaches.   Hematological:  Negative for adenopathy. Does not bruise/bleed easily.   Psychiatric/Behavioral:  Negative for self-injury, sleep disturbance and suicidal ideas. The patient is not nervous/anxious.    She has had a tremor for about a year.  She said it was similar to the tremor that her father had.  She has had a history of pneumonia several times but has not required hospitalization.  She says that her sleep is interrupted by the need to urinate.    Past Medical History  Past Medical History:   Diagnosis Date    Aneurysm of the ascending aorta, without rupture 03/01/2022    Aneurysm, ascending aorta    Chronic kidney disease, stage 2 (mild) 05/26/2020    Stage 2 chronic kidney disease    Personal history of other (healed) physical injury and trauma     History of injury of neck    Personal history of other diseases of the digestive system     History of gastroesophageal reflux (GERD)    Personal history of other infectious and parasitic diseases     History of Clostridioides difficile infection    Personal history of other medical treatment     H/O complete eye exam    Personal history of other medical treatment     History of mammogram    Personal history of other medical treatment     H/O bone density study    Personal history of other medical treatment     History of bone scan        Surgical History  Past Surgical History:   Procedure Laterality Date    CATARACT EXTRACTION      CHOLECYSTECTOMY      COLONOSCOPY  03/23/2023    DR. PEREZ - REPEAT 3 YEARS. FINAL DIAGNOSIS A.  ASCENDING COLON POLYP: TUBULAR ADENOMA.  B.  TRANSVERSE COLON POLYP: TUBULAR ADENOMA.  C.  SIGMOID COLON POLYP: TUBULOVILLOUS ADENOMA.    ESOPHAGOGASTRODUODENOSCOPY  06/28/2018    HYSTERECTOMY  1990    KNEE SURGERY      TOTAL SHOULDER ARTHROPLASTY Left 12/29/2014   Nissen fundoplication and hiatal hernia repair 2019.    Social History  Social History      Tobacco Use    Smoking status: Never    Smokeless tobacco: Never   Vaping Use    Vaping status: Never Used   Substance Use Topics    Alcohol use: Yes     Comment: OCCASSIONALLY    Drug use: Never        Family History  family history includes CARDIAC DISORDER in her sister; Diabetes type II in an other family member; Lung cancer in her father; No Known Problems in her mother.       Current Medications  Current Outpatient Medications   Medication Instructions    alendronate (FOSAMAX) 70 mg, oral, Every 7 days    amLODIPine (NORVASC) 5 mg, oral, Daily    ascorbic acid (Vitamin C) 500 mg tablet 1 tablet, Daily    atorvastatin (LIPITOR) 10 mg, oral, Nightly    calcium carbonate-vitamin D3 600 mg-20 mcg (800 unit) tablet 1 tablet, 2 times daily    DULoxetine (CYMBALTA) 30 mg, oral, Daily    qzncagagz-F1-gig-Sx-toz-hbkab (Nicotinamide, with chromium,) 500 mcg- 750 mg tablet 2 times daily    loratadine (Claritin) 10 mg tablet 1 tablet, Daily PRN    losartan (COZAAR) 100 mg, oral, Daily    multivit-min-iron-FA-lutein (Centrum Silver Women) 8 mg iron-400 mcg-300 mcg tablet 1 tablet, Daily    ondansetron (ZOFRAN) 8 mg, oral, 2 times daily PRN    prochlorperazine (COMPAZINE) 10 mg, oral, Every 6 hours PRN    vitamins A,C,E-zinc-copper (PreserVision AREDS) 2,148 mcg-113 mg-45 mg-17.4mg tablet 1 tablet, Daily           OARRS Review  I have personally reviewed the OARRS report for Monica Rosales. I have considered the risks of abuse, dependence, addiction and diversion.  Review of this record shows that she has no prescriptions for sedatives stimulants or opioids.    Vital Signs  /77   Pulse 80   Temp 35.9 °C (96.6 °F) (Skin)   Resp 16   Wt 81.7 kg (180 lb 3.2 oz)   SpO2 95%   BMI 33.25 kg/m²      Physical Exam  Vitals and nursing note reviewed. Exam conducted with a chaperone present.   Constitutional:       General: She is not in acute distress.     Appearance: She is not ill-appearing or toxic-appearing.       Comments: She is a well-developed well-nourished elderly white female with a large knot on the top of her head.  She does not appear to be acutely or chronically ill.  She is accompanied by her daughter Domonique.  She is alert, pleasant and engaged in conversation.  The lesion appears to be slightly little smaller and does have 2 components with a larger mass anteriorly and the smaller tail which is posterior.   HENT:      Head: Normocephalic and atraumatic.      Comments: She has a lump on the top of her head which is dry and reddish in color with minimal discharge at the base.  There is a posterior extension of the mass which is not as tall as the rest of the mass.  The tail of this posterior component tapers off to the left almost in a triangle whereas the largest part of the lesion is in the form of a cube     Nose: Nose normal.      Mouth/Throat:      Mouth: Mucous membranes are moist.      Pharynx: Oropharynx is clear. No oropharyngeal exudate or posterior oropharyngeal erythema.      Comments: She has an upper plate and she has 4 teeth in the bottom jaw 2 on each side.  Eyes:      General: No scleral icterus.     Extraocular Movements: Extraocular movements intact.      Conjunctiva/sclera: Conjunctivae normal.      Pupils: Pupils are equal, round, and reactive to light.   Neck:      Comments: She has a few palpable lymph nodes in the base of her left posterior cervical region that are about a centimeter.  Cardiovascular:      Rate and Rhythm: Normal rate and regular rhythm.      Heart sounds: Murmur (systolic murmur 3 out of 6) heard.   Pulmonary:      Effort: Pulmonary effort is normal. No respiratory distress.      Breath sounds: No wheezing, rhonchi or rales.   Abdominal:      General: There is no distension.      Palpations: Abdomen is soft. There is no mass.      Tenderness: There is no abdominal tenderness. There is no guarding or rebound.   Musculoskeletal:         General: Normal range of motion.       Cervical back: Normal range of motion. No rigidity or tenderness.      Right lower leg: No edema.      Left lower leg: No edema.   Lymphadenopathy:      Cervical: Cervical adenopathy (1cm size lymph node at base of neck. left) present.   Skin:     General: Skin is warm and dry.      Coloration: Skin is pale. Skin is not jaundiced.      Findings: Lesion present.      Comments: Patient has been described as 2.6 x 2.5 x 1.0 cm.  Posteriorly has an extension which is not as tall as the previous mass.  The top of the mass is somewhat white and scaly.  There is minimal discharge at the base.    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  It is hyperpigmented in an arc like shape and is minimally palpable.  It is stable.     Neurological:      Mental Status: She is alert and oriented to person, place, and time. Mental status is at baseline.      Cranial Nerves: Cranial nerve deficit present.      Sensory: Sensory deficit present.      Motor: No weakness.      Gait: Gait normal.      Comments: Somewhat hard of hearing.  She is able to get onto the examination table without any difficulty.   Psychiatric:         Mood and Affect: Mood normal.         Behavior: Behavior normal.         Thought Content: Thought content normal.         Judgment: Judgment normal.      Comments: She seems to be coping well.        Current Laboratory Data  Laboratory data drawn on 5/14/2025 showed sugar 124 creatinine of 1.09 with an EGFR of 51 and was otherwise normal.  CBC showed white count 8.4 hemoglobin 13.6 macro 42.9 and a platelet count of 460,000 with 62% polys, 28 lymphs, 7 monos and 1 eosinophil.    Recent Imaging  Imaging  See narrative for imaging    Cardiology, Vascular, and Other Imaging  No other imaging results found for the past 7 days        Pathology  Squamous cell carcinoma of the top of her head this is a  well-differentiated invasive squamous cell carcinoma which invades beyond subcutaneous fat with extensively positive margins.    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    Performance Status:  Asymptomatic    Assessment/Plan    1.  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  She has been prescribed Cemiplimab which will be given every 3 weeks.  She is to see Dr. Fong again mid July.  Today we described the side effects of this medication.  She understands that open lines of communication are foster and that she should not keep any side effects to herself but complain as soon as things are apparent.  We will have baseline blood work drawn today.  Therapy plan was approved and we gave her her first treatment 3 weeks ago she tolerated it well.  She had a rash but in retrospect she thinks that the rash was due to the change in dryer sheets.  She is here for her second infusion.  Her mass seems to be slightly smaller.    2.  Melanoma in situ.  The initial biopsies done on 1/20/2025 showed melanoma in situ in the medial skin of the left cheek with peripheral margin focally involved.  The deep margin appears to be uninvolved.    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    Rebiopsy of the skin from the left medial cheek showed residual melanoma in situ done 2/27/2025.  The margins were focally involved.    This patient may need further surgery on her melanoma on her face.  Lesion has not changed and there is a thickness about mid scar.    3.  Multiple comorbidities.  These include but are not limited to:   Hypertension  Macular degeneration  Tremor  History of C. difficile  History of 2 shoulder replacements  Hysterectomy  Cholecystectomy  Tonsillectomy and adenoidectomy  Osteoporosis  Peripheral neuropathy  Osteoarthritis  Urge incontinence  CKD  GERD  Chronic gastritis and  gastric fundal polyps 2018  Diverticular disease  Stress test in 2022 showed normal perfusion and left ventricular ejection fraction estimated at greater than 65%  Aneurysm of the ascending aorta  Colon polyps  Hyperlipidemia  Fractured fingers on the right side June 2016    We will start her treatment today.  She knows to call if she has any change in her status, questions or concerns.    Cathy Doshi MD          .

## 2025-06-04 ENCOUNTER — LAB (OUTPATIENT)
Dept: LAB | Facility: HOSPITAL | Age: 80
End: 2025-06-04
Payer: MEDICARE

## 2025-06-04 LAB
ALBUMIN SERPL BCP-MCNC: 4.1 G/DL (ref 3.4–5)
ALP SERPL-CCNC: 54 U/L (ref 33–136)
ALT SERPL W P-5'-P-CCNC: 18 U/L (ref 7–45)
ANION GAP SERPL CALC-SCNC: 13 MMOL/L (ref 10–20)
AST SERPL W P-5'-P-CCNC: 21 U/L (ref 9–39)
BASOPHILS # BLD AUTO: 0.04 X10*3/UL (ref 0–0.1)
BASOPHILS NFR BLD AUTO: 0.5 %
BILIRUB SERPL-MCNC: 0.7 MG/DL (ref 0–1.2)
BUN SERPL-MCNC: 27 MG/DL (ref 6–23)
CALCIUM SERPL-MCNC: 9.3 MG/DL (ref 8.6–10.3)
CHLORIDE SERPL-SCNC: 102 MMOL/L (ref 98–107)
CO2 SERPL-SCNC: 26 MMOL/L (ref 21–32)
CREAT SERPL-MCNC: 1.16 MG/DL (ref 0.5–1.05)
EGFRCR SERPLBLD CKD-EPI 2021: 48 ML/MIN/1.73M*2
EOSINOPHIL # BLD AUTO: 0.07 X10*3/UL (ref 0–0.4)
EOSINOPHIL NFR BLD AUTO: 0.8 %
ERYTHROCYTE [DISTWIDTH] IN BLOOD BY AUTOMATED COUNT: 13.6 % (ref 11.5–14.5)
GLUCOSE SERPL-MCNC: 146 MG/DL (ref 74–99)
HCT VFR BLD AUTO: 42.2 % (ref 36–46)
HGB BLD-MCNC: 13.8 G/DL (ref 12–16)
IMM GRANULOCYTES # BLD AUTO: 0.02 X10*3/UL (ref 0–0.5)
IMM GRANULOCYTES NFR BLD AUTO: 0.2 % (ref 0–0.9)
LDH SERPL L TO P-CCNC: 147 U/L (ref 84–246)
LYMPHOCYTES # BLD AUTO: 2.34 X10*3/UL (ref 0.8–3)
LYMPHOCYTES NFR BLD AUTO: 27.3 %
MCH RBC QN AUTO: 32.3 PG (ref 26–34)
MCHC RBC AUTO-ENTMCNC: 32.7 G/DL (ref 32–36)
MCV RBC AUTO: 99 FL (ref 80–100)
MONOCYTES # BLD AUTO: 0.62 X10*3/UL (ref 0.05–0.8)
MONOCYTES NFR BLD AUTO: 7.2 %
NEUTROPHILS # BLD AUTO: 5.49 X10*3/UL (ref 1.6–5.5)
NEUTROPHILS NFR BLD AUTO: 64 %
NRBC BLD-RTO: 0 /100 WBCS (ref 0–0)
PLATELET # BLD AUTO: 384 X10*3/UL (ref 150–450)
POTASSIUM SERPL-SCNC: 4 MMOL/L (ref 3.5–5.3)
PROT SERPL-MCNC: 6.4 G/DL (ref 6.4–8.2)
RBC # BLD AUTO: 4.27 X10*6/UL (ref 4–5.2)
SODIUM SERPL-SCNC: 137 MMOL/L (ref 136–145)
WBC # BLD AUTO: 8.6 X10*3/UL (ref 4.4–11.3)

## 2025-06-04 PROCEDURE — 83615 LACTATE (LD) (LDH) ENZYME: CPT

## 2025-06-04 PROCEDURE — 80053 COMPREHEN METABOLIC PANEL: CPT

## 2025-06-04 PROCEDURE — 85025 COMPLETE CBC W/AUTO DIFF WBC: CPT

## 2025-06-06 ENCOUNTER — OFFICE VISIT (OUTPATIENT)
Dept: HEMATOLOGY/ONCOLOGY | Facility: CLINIC | Age: 80
End: 2025-06-06
Payer: MEDICARE

## 2025-06-06 ENCOUNTER — INFUSION (OUTPATIENT)
Dept: HEMATOLOGY/ONCOLOGY | Facility: CLINIC | Age: 80
End: 2025-06-06
Payer: MEDICARE

## 2025-06-06 VITALS — WEIGHT: 179.45 LBS | TEMPERATURE: 95 F | HEART RATE: 69 BPM | RESPIRATION RATE: 18 BRPM | BODY MASS INDEX: 33.11 KG/M2

## 2025-06-06 VITALS
DIASTOLIC BLOOD PRESSURE: 80 MMHG | WEIGHT: 180.6 LBS | RESPIRATION RATE: 16 BRPM | OXYGEN SATURATION: 95 % | SYSTOLIC BLOOD PRESSURE: 129 MMHG | BODY MASS INDEX: 33.32 KG/M2 | TEMPERATURE: 96.6 F | HEART RATE: 68 BPM

## 2025-06-06 DIAGNOSIS — C44.42 SQUAMOUS CELL CARCINOMA OF SCALP: ICD-10-CM

## 2025-06-06 DIAGNOSIS — C44.42 SQUAMOUS CELL CARCINOMA OF SCALP: Primary | ICD-10-CM

## 2025-06-06 PROCEDURE — 3079F DIAST BP 80-89 MM HG: CPT | Performed by: INTERNAL MEDICINE

## 2025-06-06 PROCEDURE — 96413 CHEMO IV INFUSION 1 HR: CPT

## 2025-06-06 PROCEDURE — 99213 OFFICE O/P EST LOW 20 MIN: CPT | Performed by: INTERNAL MEDICINE

## 2025-06-06 PROCEDURE — 3074F SYST BP LT 130 MM HG: CPT | Performed by: INTERNAL MEDICINE

## 2025-06-06 PROCEDURE — 1159F MED LIST DOCD IN RCRD: CPT | Performed by: INTERNAL MEDICINE

## 2025-06-06 PROCEDURE — 1126F AMNT PAIN NOTED NONE PRSNT: CPT | Performed by: INTERNAL MEDICINE

## 2025-06-06 PROCEDURE — 2500000004 HC RX 250 GENERAL PHARMACY W/ HCPCS (ALT 636 FOR OP/ED): Mod: JZ | Performed by: INTERNAL MEDICINE

## 2025-06-06 RX ORDER — HEPARIN SODIUM,PORCINE/PF 10 UNIT/ML
50 SYRINGE (ML) INTRAVENOUS AS NEEDED
OUTPATIENT
Start: 2025-06-06

## 2025-06-06 RX ORDER — EPINEPHRINE 0.3 MG/.3ML
0.3 INJECTION SUBCUTANEOUS EVERY 5 MIN PRN
Status: CANCELLED | OUTPATIENT
Start: 2025-06-06

## 2025-06-06 RX ORDER — DIPHENHYDRAMINE HYDROCHLORIDE 50 MG/ML
50 INJECTION, SOLUTION INTRAMUSCULAR; INTRAVENOUS AS NEEDED
Status: CANCELLED | OUTPATIENT
Start: 2025-06-06

## 2025-06-06 RX ORDER — PROCHLORPERAZINE MALEATE 5 MG
10 TABLET ORAL EVERY 6 HOURS PRN
Status: CANCELLED | OUTPATIENT
Start: 2025-06-06

## 2025-06-06 RX ORDER — FAMOTIDINE 10 MG/ML
20 INJECTION, SOLUTION INTRAVENOUS ONCE AS NEEDED
Status: CANCELLED | OUTPATIENT
Start: 2025-06-06

## 2025-06-06 RX ORDER — PROCHLORPERAZINE EDISYLATE 5 MG/ML
10 INJECTION INTRAMUSCULAR; INTRAVENOUS EVERY 6 HOURS PRN
Status: CANCELLED | OUTPATIENT
Start: 2025-06-06

## 2025-06-06 RX ORDER — HEPARIN 100 UNIT/ML
500 SYRINGE INTRAVENOUS AS NEEDED
OUTPATIENT
Start: 2025-06-06

## 2025-06-06 RX ORDER — ALBUTEROL SULFATE 0.83 MG/ML
3 SOLUTION RESPIRATORY (INHALATION) AS NEEDED
Status: CANCELLED | OUTPATIENT
Start: 2025-06-06

## 2025-06-06 RX ADMIN — CEMIPLIMAB-RWLC 350 MG: 50 INJECTION INTRAVENOUS at 09:11

## 2025-06-06 ASSESSMENT — ENCOUNTER SYMPTOMS
BRUISES/BLEEDS EASILY: 0
TROUBLE SWALLOWING: 0
COUGH: 0
APPETITE CHANGE: 0
NUMBNESS: 0
SLEEP DISTURBANCE: 0
HEADACHES: 0
LIGHT-HEADEDNESS: 0
WOUND: 1
ADENOPATHY: 0
DYSURIA: 0
SHORTNESS OF BREATH: 0
VOMITING: 0
FREQUENCY: 0
DIAPHORESIS: 1
NAUSEA: 0
WEAKNESS: 0
DIARRHEA: 0
ABDOMINAL PAIN: 0
ARTHRALGIAS: 1
UNEXPECTED WEIGHT CHANGE: 0
CONSTIPATION: 0
NERVOUS/ANXIOUS: 0
FATIGUE: 1
JOINT SWELLING: 1

## 2025-06-06 ASSESSMENT — PAIN SCALES - GENERAL: PAINLEVEL_OUTOF10: 0-NO PAIN

## 2025-06-06 NOTE — PROGRESS NOTES
Patient ID:Monica Rosales is a 79 y.o. 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 Rosales has been found to have rapidly growing squamous cell carcinoma of the vertex of the skull.  She had another one close by that was surgically excised.  This lesion continues to grow.  She was seen by Dr. Cinthia Fong the [facility] who recommended treatment with cemiplimab.  She is here to get treatment closer to home.  The melanoma of the left cheek of her face has been resected twice with positive margins.  It is in situ and margins are positive.    Chronological History  11/5/2024.  Saw Dr. Hampton in follow-up of head trauma after a box falling on her face.  He described a hyperpigmented lesion on her face and referred her to dermatology.    12/17/2024.  Saw Dr. Hampton for a scalp lesion that would not heal.  She said that the lesion had been present for couple months and was not painful.  She said that she put cream on it but it did not respond.  She said it started out tiny but very fast.  He described as a nodular skin lesion with central erosion 1 inch in diameter.  He referred her to plastic surgery.  She was referred see the dermatologist on 1/8/2025 at [facility].    1/6/2025.  Saw Dr. Hampton in follow-up for sinusitis and was given a prescription for oral antibiotics with azithromycin.    1/20/2025.  She had surgery that [facility] including an excision of her left cheek medially x 2 and a shave biopsy of the central occipital scalp.  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    Maximum tumor thickness was 0.1 mm.  No mitoses were seen and there was no lymphovascular invasion.  Tumor infiltrating lymphocytes were seen but nonbrisk.  There was no evidence of invasive melanoma.  No lymph nodes were submitted or found.  This is a pT1a lesion.    The lesion on the central scalp is invasive squamous cell carcinoma well-differentiated and was at least 1.7 cm with a depth of invasion of at least 6 mm.  Perineural invasion was not identified.  The deep and peripheral margins were extensively positive for carcinoma.    2/24/2025 CAT scans of the head and soft tissues of the neck showed no parenchymal or leptomeningeal enhancement.  There is an enhancing lobulated soft tissue lesion in the right parietal scalp at the vertex measuring 2.6 x 2.5 x 1.0 which contacts the underlying calvarium without arlyn involvement.  There is no intracranial extension.    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  Retropharyngeal and prevertebral soft tissues were normal.  There is no significant cervical adenopathy by size criteria but there were a few rounded nonspecific enhancing suboccipital lymph nodes measuring up to 0.6 cm on the right side.  Thyroid was unremarkable as were the parotids, submandibular glands, paranasal sinuses and mastoids.  There was evidence of bilateral lens replacements and degenerative change of the cervical spine was seen.  There is no evidence of high-grade spinal stenosis.    2/26/2025.  Dr. Sterling Padgett submitted another biopsy of the left medial skin of the cheek which showed residual melanoma in situ.  This was reviewed at the [facility] and is said to be a challenging case with features consistent with residual melanoma in situ.    2/28/2025.  [facility] was contacted from [place]  plastic surgery referred to Dr. Fong from Dr. Fernandez Stone for melanoma.    3/3/2025.  DEXA scan showed osteoporosis.    3/6/2025.  Surgical pathology on skin from left medial cheek showed residual melanoma in situ.  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  The provider was Margaret Young MD sending the specimen to [facility] for consultation.    3/31/2025.  Surgical pathology at [facility] reviewed the specimens from 1/20/2025 and those from the second biopsy.  The diagnoses do not appear to have been changed.    4/2/2025.  Patient was referred to Dr. Cinthia Fong at [facility].  She was referred to local oncology for Cemiplimab.  She was to return to [place] mid July with repeat CT scans.    4/14/2025.  She is here today with her daughter Domonique.  She says that the lesion on the top of her head continues to grow.  She says that sometimes there is a drainage from it.  It is usually not painful but it is annoying and sometimes it itches.  She says it feels as if there is a weight on her head.  She said that she cannot really cover it up with her hair.  She wonders if she can use hair products to cover it up better.  Her daughter encouraged her to just leave it alone.    She knows that she will undergo a series of intravenous therapy treatments and then will have a repeat CT scan and will return to see Dr. Fong mid July for further evaluation.    Mrs. Rosales says [place]  She said she never wore a hat and never wore sunscreen.  She is blue-eyed fair skinned.  No one else in her family has had serious skin cancers.    She is otherwise a very vibrant woman.  She is able to perform all of her activities of daily living.  She does not have significant pain.  She has not lost any weight.  She is not aware of any adenopathy.  She has had no bleeding or infection.  She has aching pain from her arthritis.  She denies numbness and tingling.    She says that  she was told by someone that the Cemiplimab will take care of both the squamous cell carcinoma on her vertex of her scalp and also the melanoma on her cheek.  I told her that I did not think that that was the case.    4/14/2025.  Laboratory data showed that her white count was 13.1 hemoglobin 14.4 hematocrit 44.8 with a platelet count of 430,000 with a normal white blood cell differential.  CMP was normal except for an EGFR of 56.  Hepatitis B antigen and antibody were nonreactive as was the core antigen.  Cortisol was normal.  LDH is normal.  ACTH was normal.  TSH was normal.    4/25/2025.  She is here with her daughter to start her treatment.  She says that her appetite is good and her weight is stable.  She has no new lumps or bumps.  She is glad to get this started.  We reviewed some of the most common side effects of this treatment and she knows to ask for help and not to hide her symptoms.  We reviewed her normal laboratory data.  She has had no signs of infection and no signs of bleeding.  They had no new questions.  We said that her elevated white count is probably reactive.  We made sure that she had antinausea medicines available.  We will see her again in about 3 weeks.  She knows to call if she has any problems.    5/16/2025.  She is here today for her next infusion of Cemiplimab.  She did well last time except for a rash that she had on her chest.  She has sent me a picture of it.  In retrospect she said that she had changed the dryer sheets that she used with her wash and she thinks that that might have been the cause of the rash.  The rash subsequently subsided without any significant treatment.  She had no other ill effects.    She said that the mass seems smaller she said that there is a separation of the mass on her head into 2 pieces.  She was encouraged not to pick at it.  She says that it is not painful.  She has had sweats but this is not a change she has had no fevers or chills or bleeding.   She has had no diarrhea or change in her bowel habits.  Her appetite is good and her weight is stable.  She wondered if this would do anything for her melanoma on her face and I told her that this drug is not indicated for melanoma per se.    She is here today with her daughter.  Multiple questions were answered to her satisfaction.  They have an appointment to go back and see Dr. Fong at [facility] in July.  She will have her treatment today.  We will see her again in 3 weeks.  She was encouraged to call if she has any problems.    Interval Note  6/4/2025.  Laboratory data showed a sugar of 146 BUN of 27 creatinine 1.16 and EGFR 40.  Liver function test were normal.  Today we discussed that she needs to keep hydrated and she verbalized that she does drink several bottles of water a day.  We recommended that she use sports drinks.  She does drink several cups of coffee in the morning.  I told her that that was all right but she needed to continue to push fluids.  It is possible that we may be seeing some toxicity from the Cemiplimab.  I think that it would be all right for her to go forward with her treatment today.    She says that she has a lot of irritation and at the site of the mass on her head.  It has shrunk but it has not fallen off yet.  She says that it is difficult for her to comb her hair and occasionally she will irritate it.  There looks like there might have been some minor bleeding.  Otherwise she has tolerated the medication well.  She has no pain.  Her appetite is good her weight is stable.  She has had no problems with fevers or other signs of infection she does have sweats.  She has had minimal bleeding as noted above.  She is here today with her daughter who affirms this history.  We will see her again in 3 weeks.    Comorbidities.  Hypertension  Macular degeneration  Tremor  History of C. difficile  History of 2 shoulder replacements  Hysterectomy  Cholecystectomy  Tonsillectomy and  adenoidectomy  Osteoporosis  Peripheral neuropathy  Osteoarthritis  Urge incontinence  CKD, seen by Dr. Tomas after a bout her EL related to her surgery.  GERD  Aneurysm of the ascending aorta  Colon polyps  Hyperlipidemia  Fractured fingers 2016.  Chronic gastritis and gastric fundal polyps 2018  Diverticular disease  Stress test in 2022 showed normal perfusion and left ventricular ejection fraction estimated at greater than 65%    Monitoring Parameters  Histories and physical examinations CAT scans and PET scans    Review of Systems - Oncology   Review of Systems   Constitutional:  Positive for diaphoresis (mild) and fatigue. Negative for appetite change and unexpected weight change.   HENT:  Negative for dental problem, hearing loss, mouth sores and trouble swallowing.    Eyes:  Negative for visual disturbance.   Respiratory:  Negative for cough and shortness of breath.    Cardiovascular:  Negative for leg swelling.   Gastrointestinal:  Negative for abdominal pain, constipation, diarrhea, nausea and vomiting.   Endocrine: Negative for polyuria.   Genitourinary:  Negative for dysuria, frequency and urgency.   Musculoskeletal:  Positive for arthralgias and joint swelling.   Skin:  Positive for pallor and wound (areqa on back of her head). Negative for rash.   Neurological:  Negative for weakness, light-headedness, numbness and headaches.   Hematological:  Negative for adenopathy. Does not bruise/bleed easily.   Psychiatric/Behavioral:  Negative for self-injury, sleep disturbance and suicidal ideas. The patient is not nervous/anxious.    She has had a tremor for about a year.  She said it was similar to the tremor that her father had.  She has had a history of pneumonia several times but has not required hospitalization.  She says that her sleep is interrupted by the need to urinate.    Past Medical History  Past Medical History:   Diagnosis Date    Aneurysm of the ascending aorta, without rupture 03/01/2022     Aneurysm, ascending aorta    Chronic kidney disease, stage 2 (mild) 05/26/2020    Stage 2 chronic kidney disease    Personal history of other (healed) physical injury and trauma     History of injury of neck    Personal history of other diseases of the digestive system     History of gastroesophageal reflux (GERD)    Personal history of other infectious and parasitic diseases     History of Clostridioides difficile infection    Personal history of other medical treatment     H/O complete eye exam    Personal history of other medical treatment     History of mammogram    Personal history of other medical treatment     H/O bone density study    Personal history of other medical treatment     History of bone scan        Surgical History  Past Surgical History:   Procedure Laterality Date    CATARACT EXTRACTION      CHOLECYSTECTOMY      COLONOSCOPY  03/23/2023    DR. PEREZ - REPEAT 3 YEARS. FINAL DIAGNOSIS A.  ASCENDING COLON POLYP: TUBULAR ADENOMA.  B.  TRANSVERSE COLON POLYP: TUBULAR ADENOMA.  C.  SIGMOID COLON POLYP: TUBULOVILLOUS ADENOMA.    ESOPHAGOGASTRODUODENOSCOPY  06/28/2018    HYSTERECTOMY  1990    KNEE SURGERY      TOTAL SHOULDER ARTHROPLASTY Left 12/29/2014   Nissen fundoplication and hiatal hernia repair 2019.    Social History  Social History     Tobacco Use    Smoking status: Never    Smokeless tobacco: Never   Vaping Use    Vaping status: Never Used   Substance Use Topics    Alcohol use: Yes     Comment: OCCASSIONALLY    Drug use: Never        Family History  family history includes CARDIAC DISORDER in her sister; Diabetes type II in an other family member; Lung cancer in her father; No Known Problems in her mother.       Current Medications  Current Outpatient Medications   Medication Instructions    alendronate (FOSAMAX) 70 mg, oral, Every 7 days    amLODIPine (NORVASC) 5 mg, oral, Daily    ascorbic acid (Vitamin C) 500 mg tablet 1 tablet, Daily    atorvastatin (LIPITOR) 10 mg, oral, Nightly     calcium carbonate-vitamin D3 600 mg-20 mcg (800 unit) tablet 1 tablet, 2 times daily    DULoxetine (CYMBALTA) 30 mg, oral, Daily    vlyoatrre-U0-ekp-Jr-uui-zwbcx (Nicotinamide, with chromium,) 500 mcg- 750 mg tablet 2 times daily    loratadine (Claritin) 10 mg tablet 1 tablet, Daily PRN    losartan (COZAAR) 100 mg, oral, Daily    multivit-min-iron-FA-lutein (Centrum Silver Women) 8 mg iron-400 mcg-300 mcg tablet 1 tablet, Daily    ondansetron (ZOFRAN) 8 mg, oral, 2 times daily PRN    prochlorperazine (COMPAZINE) 10 mg, oral, Every 6 hours PRN    vitamins A,C,E-zinc-copper (PreserVision AREDS) 2,148 mcg-113 mg-45 mg-17.4mg tablet 1 tablet, Daily           OARRS Review  I have personally reviewed the OARRS report for Monica Rosales. I have considered the risks of abuse, dependence, addiction and diversion.  Review of this record shows that she has no prescriptions for sedatives stimulants or opioids.    Vital Signs  /80   Pulse 68   Temp 35.9 °C (96.6 °F) (Skin)   Resp 16   Wt 81.9 kg (180 lb 9.6 oz)   SpO2 95%   BMI 33.32 kg/m²      Physical Exam  Vitals and nursing note reviewed. Exam conducted with a chaperone present.   Constitutional:       General: She is not in acute distress.     Appearance: She is not ill-appearing or toxic-appearing.      Comments: She is a well-developed well-nourished elderly white female with a large knot on the top of her head.  She does not appear to be acutely or chronically ill.  She is accompanied by her daughter Domonique.  She is alert, pleasant and engaged in conversation.  The lesion appears to be slightly little smaller and does have 2 components with a larger mass anteriorly and the smaller tail which is posterior.  There is a minor improvement since last visit.   HENT:      Head: Normocephalic and atraumatic.      Comments: She has a lump on the top of her head which is dry and reddish in color with minimal discharge at the base.  There is a posterior extension of  the mass which is not as tall as the rest of the mass.  The tail of this posterior component tapers off to the left almost in a triangle whereas the largest part of the lesion is in the form of a cube     Nose: Nose normal.      Mouth/Throat:      Mouth: Mucous membranes are moist.      Pharynx: Oropharynx is clear. No oropharyngeal exudate or posterior oropharyngeal erythema.      Comments: She has an upper plate and she has 4 teeth in the bottom jaw 2 on each side.  Eyes:      General: No scleral icterus.     Extraocular Movements: Extraocular movements intact.      Conjunctiva/sclera: Conjunctivae normal.      Pupils: Pupils are equal, round, and reactive to light.   Neck:      Comments: She has a few palpable lymph nodes in the base of her left posterior cervical region that are about a centimeter.  These do not seem to be as prominent.  Cardiovascular:      Rate and Rhythm: Normal rate and regular rhythm.      Heart sounds: Murmur (systolic murmur 3 out of 6) heard.   Pulmonary:      Effort: Pulmonary effort is normal. No respiratory distress.      Breath sounds: No wheezing, rhonchi or rales.   Abdominal:      General: There is no distension.      Palpations: Abdomen is soft. There is no mass.      Tenderness: There is no abdominal tenderness. There is no guarding or rebound.   Musculoskeletal:         General: Normal range of motion.      Cervical back: Normal range of motion. No rigidity or tenderness.      Right lower leg: No edema.      Left lower leg: No edema.   Lymphadenopathy:      Cervical: Cervical adenopathy (1cm size lymph node at base of neck. left) present.   Skin:     General: Skin is warm and dry.      Coloration: Skin is pale. Skin is not jaundiced.      Findings: Lesion present.      Comments: Patient has been described as 2.6 x 2.5 x 1.0 cm.  Posteriorly has an extension which is not as tall as the previous mass.  The top of the mass is somewhat white and scaly.  There is minimal discharge  at the base.    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  It is hyperpigmented in an arc like shape and is minimally palpable.  It is stable.  At the lowest point in the scar, there is a palpable pinhead sized spot which she says was the site of a suture.     Neurological:      Mental Status: She is alert and oriented to person, place, and time. Mental status is at baseline.      Cranial Nerves: Cranial nerve deficit present.      Sensory: Sensory deficit present.      Motor: No weakness.      Gait: Gait normal.      Comments: Somewhat hard of hearing.  She is able to get onto the examination table without any difficulty.   Psychiatric:         Mood and Affect: Mood normal.         Behavior: Behavior normal.         Thought Content: Thought content normal.         Judgment: Judgment normal.      Comments: She seems to be coping well.        Current Laboratory Data  See narrative for laboratory data    Recent Imaging  Imaging  See narrative for imaging    Cardiology, Vascular, and Other Imaging  No other imaging results found for the past 7 days        Pathology  Squamous cell carcinoma of the top of her head this is a well-differentiated invasive squamous cell carcinoma which invades beyond subcutaneous fat with extensively positive margins.    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    Performance Status:  Asymptomatic    Assessment/Plan    1.  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  She has been prescribed Cemiplimab which will be given every 3 weeks.  She is to see Dr. Fong again mid July.  Today we described the side effects of this medication.  She understands that open lines of communication are foster and that she should not keep any side effects to herself but complain as soon as things are apparent.    She is tolerating the treatment well.  There is  a gradual decrease in the size of the lesion on her head.  It is still irritating to her.  There has been minimal bleeding.  She has had no other side effects of the medication.    2.  Melanoma in situ.  The initial biopsies done on 1/20/2025 showed melanoma in situ in the medial skin of the left cheek with peripheral margin focally involved.  The deep margin appears to be uninvolved.  She has a pinhead sized palpable spot at the lowest point in her incision which she says was the site of skin suture.    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    Rebiopsy of the skin from the left medial cheek showed residual melanoma in situ done 2/27/2025.  The margins were focally involved.    This patient may need further surgery on her melanoma on her face.  Lesion has not changed and there is a thickness about mid scar.    3.  Multiple comorbidities.  These include but are not limited to:   Hypertension  Macular degeneration  Tremor  History of C. difficile  History of 2 shoulder replacements  Hysterectomy  Cholecystectomy  Tonsillectomy and adenoidectomy  Osteoporosis  Peripheral neuropathy  Osteoarthritis  Urge incontinence  CKD  GERD  Chronic gastritis and gastric fundal polyps 2018  Diverticular disease  Stress test in 2022 showed normal perfusion and left ventricular ejection fraction estimated at greater than 65%  Aneurysm of the ascending aorta  Colon polyps  Hyperlipidemia  Fractured fingers on the right side June 2016    We will give her her treatment today and we will see her again in 3 weeks.  She knows to call if she has any change in her status, questions or concerns.    Cathy Doshi MD

## 2025-06-06 NOTE — PROGRESS NOTES
Pt getting tx today  Labs 6/26 6/27 9am MD          1000 Cemiplimab to follow  Reviewed AVS with patient- patient verbalizes understanding

## 2025-06-06 NOTE — PATIENT INSTRUCTIONS
Reviewed labs and recent medical history.  Discussed the results of her kidney functions and noted that her creatinine was a little higher. Encouraged her to drink more fluids including gatorades or powerades.  Okay for her treatment today, 6/6/25  Return to see MD in 3 weeks for her next treatment.

## 2025-06-25 ENCOUNTER — LAB (OUTPATIENT)
Dept: LAB | Facility: HOSPITAL | Age: 80
End: 2025-06-25
Payer: MEDICARE

## 2025-06-25 LAB
ALBUMIN SERPL BCP-MCNC: 4.2 G/DL (ref 3.4–5)
ALP SERPL-CCNC: 60 U/L (ref 33–136)
ALT SERPL W P-5'-P-CCNC: 18 U/L (ref 7–45)
ANION GAP SERPL CALC-SCNC: 12 MMOL/L (ref 10–20)
AST SERPL W P-5'-P-CCNC: 22 U/L (ref 9–39)
BASOPHILS # BLD AUTO: 0.04 X10*3/UL (ref 0–0.1)
BASOPHILS NFR BLD AUTO: 0.4 %
BILIRUB SERPL-MCNC: 0.5 MG/DL (ref 0–1.2)
BUN SERPL-MCNC: 25 MG/DL (ref 6–23)
CALCIUM SERPL-MCNC: 9.4 MG/DL (ref 8.6–10.3)
CHLORIDE SERPL-SCNC: 101 MMOL/L (ref 98–107)
CO2 SERPL-SCNC: 27 MMOL/L (ref 21–32)
CREAT SERPL-MCNC: 1.1 MG/DL (ref 0.5–1.05)
EGFRCR SERPLBLD CKD-EPI 2021: 51 ML/MIN/1.73M*2
EOSINOPHIL # BLD AUTO: 0.11 X10*3/UL (ref 0–0.4)
EOSINOPHIL NFR BLD AUTO: 1.1 %
ERYTHROCYTE [DISTWIDTH] IN BLOOD BY AUTOMATED COUNT: 13.6 % (ref 11.5–14.5)
GLUCOSE SERPL-MCNC: 115 MG/DL (ref 74–99)
HCT VFR BLD AUTO: 44.7 % (ref 36–46)
HGB BLD-MCNC: 14.7 G/DL (ref 12–16)
IMM GRANULOCYTES # BLD AUTO: 0.02 X10*3/UL (ref 0–0.5)
IMM GRANULOCYTES NFR BLD AUTO: 0.2 % (ref 0–0.9)
LDH SERPL L TO P-CCNC: 158 U/L (ref 84–246)
LYMPHOCYTES # BLD AUTO: 2.95 X10*3/UL (ref 0.8–3)
LYMPHOCYTES NFR BLD AUTO: 29.2 %
MCH RBC QN AUTO: 32.2 PG (ref 26–34)
MCHC RBC AUTO-ENTMCNC: 32.9 G/DL (ref 32–36)
MCV RBC AUTO: 98 FL (ref 80–100)
MONOCYTES # BLD AUTO: 0.82 X10*3/UL (ref 0.05–0.8)
MONOCYTES NFR BLD AUTO: 8.1 %
NEUTROPHILS # BLD AUTO: 6.16 X10*3/UL (ref 1.6–5.5)
NEUTROPHILS NFR BLD AUTO: 61 %
NRBC BLD-RTO: 0 /100 WBCS (ref 0–0)
PLATELET # BLD AUTO: 405 X10*3/UL (ref 150–450)
POTASSIUM SERPL-SCNC: 3.9 MMOL/L (ref 3.5–5.3)
PROT SERPL-MCNC: 6.6 G/DL (ref 6.4–8.2)
RBC # BLD AUTO: 4.57 X10*6/UL (ref 4–5.2)
SODIUM SERPL-SCNC: 136 MMOL/L (ref 136–145)
WBC # BLD AUTO: 10.1 X10*3/UL (ref 4.4–11.3)

## 2025-06-25 PROCEDURE — 80053 COMPREHEN METABOLIC PANEL: CPT

## 2025-06-25 PROCEDURE — 85025 COMPLETE CBC W/AUTO DIFF WBC: CPT

## 2025-06-25 PROCEDURE — 83615 LACTATE (LD) (LDH) ENZYME: CPT

## 2025-06-27 ENCOUNTER — INFUSION (OUTPATIENT)
Dept: HEMATOLOGY/ONCOLOGY | Facility: CLINIC | Age: 80
End: 2025-06-27
Payer: MEDICARE

## 2025-06-27 ENCOUNTER — OFFICE VISIT (OUTPATIENT)
Dept: HEMATOLOGY/ONCOLOGY | Facility: CLINIC | Age: 80
End: 2025-06-27
Payer: MEDICARE

## 2025-06-27 VITALS
DIASTOLIC BLOOD PRESSURE: 81 MMHG | WEIGHT: 180.2 LBS | OXYGEN SATURATION: 95 % | BODY MASS INDEX: 33.25 KG/M2 | TEMPERATURE: 97.2 F | RESPIRATION RATE: 16 BRPM | HEART RATE: 82 BPM | SYSTOLIC BLOOD PRESSURE: 120 MMHG

## 2025-06-27 VITALS — RESPIRATION RATE: 16 BRPM | HEART RATE: 60 BPM | DIASTOLIC BLOOD PRESSURE: 81 MMHG | SYSTOLIC BLOOD PRESSURE: 124 MMHG

## 2025-06-27 DIAGNOSIS — C44.42 SQUAMOUS CELL CARCINOMA OF SCALP: Primary | ICD-10-CM

## 2025-06-27 DIAGNOSIS — C44.42 SQUAMOUS CELL CARCINOMA OF SCALP: ICD-10-CM

## 2025-06-27 PROCEDURE — 99213 OFFICE O/P EST LOW 20 MIN: CPT | Performed by: INTERNAL MEDICINE

## 2025-06-27 PROCEDURE — 1159F MED LIST DOCD IN RCRD: CPT | Performed by: INTERNAL MEDICINE

## 2025-06-27 PROCEDURE — 1126F AMNT PAIN NOTED NONE PRSNT: CPT | Performed by: INTERNAL MEDICINE

## 2025-06-27 PROCEDURE — 3079F DIAST BP 80-89 MM HG: CPT | Performed by: INTERNAL MEDICINE

## 2025-06-27 PROCEDURE — 96413 CHEMO IV INFUSION 1 HR: CPT

## 2025-06-27 PROCEDURE — 2500000004 HC RX 250 GENERAL PHARMACY W/ HCPCS (ALT 636 FOR OP/ED): Mod: JZ | Performed by: INTERNAL MEDICINE

## 2025-06-27 PROCEDURE — 3074F SYST BP LT 130 MM HG: CPT | Performed by: INTERNAL MEDICINE

## 2025-06-27 RX ORDER — EPINEPHRINE 0.3 MG/.3ML
0.3 INJECTION SUBCUTANEOUS EVERY 5 MIN PRN
Status: CANCELLED | OUTPATIENT
Start: 2025-06-27

## 2025-06-27 RX ORDER — DIPHENHYDRAMINE HYDROCHLORIDE 50 MG/ML
50 INJECTION, SOLUTION INTRAMUSCULAR; INTRAVENOUS AS NEEDED
Status: CANCELLED | OUTPATIENT
Start: 2025-06-27

## 2025-06-27 RX ORDER — FAMOTIDINE 10 MG/ML
20 INJECTION, SOLUTION INTRAVENOUS ONCE AS NEEDED
Status: CANCELLED | OUTPATIENT
Start: 2025-06-27

## 2025-06-27 RX ORDER — ALBUTEROL SULFATE 0.83 MG/ML
3 SOLUTION RESPIRATORY (INHALATION) AS NEEDED
Status: CANCELLED | OUTPATIENT
Start: 2025-06-27

## 2025-06-27 RX ORDER — PROCHLORPERAZINE EDISYLATE 5 MG/ML
10 INJECTION INTRAMUSCULAR; INTRAVENOUS EVERY 6 HOURS PRN
Status: CANCELLED | OUTPATIENT
Start: 2025-06-27

## 2025-06-27 RX ORDER — HEPARIN SODIUM,PORCINE/PF 10 UNIT/ML
50 SYRINGE (ML) INTRAVENOUS AS NEEDED
OUTPATIENT
Start: 2025-06-27

## 2025-06-27 RX ORDER — HEPARIN 100 UNIT/ML
500 SYRINGE INTRAVENOUS AS NEEDED
OUTPATIENT
Start: 2025-06-27

## 2025-06-27 RX ORDER — PROCHLORPERAZINE MALEATE 5 MG
10 TABLET ORAL EVERY 6 HOURS PRN
Status: CANCELLED | OUTPATIENT
Start: 2025-06-27

## 2025-06-27 RX ADMIN — CEMIPLIMAB-RWLC 350 MG: 50 INJECTION INTRAVENOUS at 09:57

## 2025-06-27 ASSESSMENT — ENCOUNTER SYMPTOMS
SHORTNESS OF BREATH: 0
SLEEP DISTURBANCE: 0
VOMITING: 0
COUGH: 0
DIARRHEA: 0
LIGHT-HEADEDNESS: 0
ADENOPATHY: 0
TROUBLE SWALLOWING: 0
DIAPHORESIS: 1
APPETITE CHANGE: 0
NAUSEA: 0
BRUISES/BLEEDS EASILY: 0
WEAKNESS: 0
UNEXPECTED WEIGHT CHANGE: 0
NERVOUS/ANXIOUS: 0
DYSURIA: 0
ABDOMINAL PAIN: 0
JOINT SWELLING: 1
FREQUENCY: 0
CONSTIPATION: 0
FATIGUE: 1
WOUND: 1
NUMBNESS: 0
ARTHRALGIAS: 1
HEADACHES: 0

## 2025-06-27 ASSESSMENT — PAIN SCALES - GENERAL: PAINLEVEL_OUTOF10: 0-NO PAIN

## 2025-06-27 NOTE — PATIENT INSTRUCTIONS
"Reviewed labs and recent medical history.  Discussed with her regarding the area on her scalp that \"fell off\". She is happy about this event.  Encouraged her to keep hydrated and exercising.  Okay for her treatment today, 6/27/25  She will have her follow up scans and testing at [facility] when she has her appointment in July 30.  Return to see MD in 3 weeks for her next treatment.  "

## 2025-06-27 NOTE — PROGRESS NOTES
Patient ID:Monica Rosales is a 80 y.o. 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 Rosales has been found to have rapidly growing squamous cell carcinoma of the vertex of the skull.  She had another one close by that was surgically excised.  This lesion continues to grow.  She was seen by Dr. Cinthia Fong the [facility] who recommended treatment with cemiplimab.  She is here to get treatment closer to home.  The melanoma of the left cheek of her face has been resected twice with positive margins.  It is in situ and margins are positive.    Chronological History  11/5/2024.  Saw Dr. Hampton in follow-up of head trauma after a box falling on her face.  He described a hyperpigmented lesion on her face and referred her to dermatology.    12/17/2024.  Saw Dr. Hampton for a scalp lesion that would not heal.  She said that the lesion had been present for couple months and was not painful.  She said that she put cream on it but it did not respond.  She said it started out tiny but very fast.  He described as a nodular skin lesion with central erosion 1 inch in diameter.  He referred her to plastic surgery.  She was referred see the dermatologist on 1/8/2025 at [facility].    1/6/2025.  Saw Dr. Hampton in follow-up for sinusitis and was given a prescription for oral antibiotics with azithromycin.    1/20/2025.  She had surgery that [facility] including an excision of her left cheek medially x 2 and a shave biopsy of the central occipital scalp.  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    Maximum tumor thickness was 0.1 mm.  No mitoses were seen and there was no lymphovascular invasion.  Tumor infiltrating lymphocytes were seen but nonbrisk.  There was no evidence of invasive melanoma.  No lymph nodes were submitted or found.  This is a pT1a lesion.    The lesion on the central scalp is invasive squamous cell carcinoma well-differentiated and was at least 1.7 cm with a depth of invasion of at least 6 mm.  Perineural invasion was not identified.  The deep and peripheral margins were extensively positive for carcinoma.    2/24/2025 CAT scans of the head and soft tissues of the neck showed no parenchymal or leptomeningeal enhancement.  There is an enhancing lobulated soft tissue lesion in the right parietal scalp at the vertex measuring 2.6 x 2.5 x 1.0 which contacts the underlying calvarium without arlyn involvement.  There is no intracranial extension.    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  Retropharyngeal and prevertebral soft tissues were normal.  There is no significant cervical adenopathy by size criteria but there were a few rounded nonspecific enhancing suboccipital lymph nodes measuring up to 0.6 cm on the right side.  Thyroid was unremarkable as were the parotids, submandibular glands, paranasal sinuses and mastoids.  There was evidence of bilateral lens replacements and degenerative change of the cervical spine was seen.  There is no evidence of high-grade spinal stenosis.    2/26/2025.  Dr. Sterilng Padgett submitted another biopsy of the left medial skin of the cheek which showed residual melanoma in situ.  This was reviewed at the [facility] and is said to be a challenging case with features consistent with residual melanoma in situ.    2/28/2025.  [facility] was contacted from [place]  plastic surgery referred to Dr. Fong from Dr. Fernandez Stone for melanoma.    3/3/2025.  DEXA scan showed osteoporosis.    3/6/2025.  Surgical pathology on skin from left medial cheek showed residual melanoma in situ.  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  The provider was Margaret Young MD sending the specimen to [facility] for consultation.    3/31/2025.  Surgical pathology at [facility] reviewed the specimens from 1/20/2025 and those from the second biopsy.  The diagnoses do not appear to have been changed.    4/2/2025.  Patient was referred to Dr. Cinthia Fong at [facility].  She was referred to local oncology for Cemiplimab.  She was to return to [place] mid July with repeat CT scans.    4/14/2025.  She is here today with her daughter Domonique.  She says that the lesion on the top of her head continues to grow.  She says that sometimes there is a drainage from it.  It is usually not painful but it is annoying and sometimes it itches.  She says it feels as if there is a weight on her head.  She said that she cannot really cover it up with her hair.  She wonders if she can use hair products to cover it up better.  Her daughter encouraged her to just leave it alone.    She knows that she will undergo a series of intravenous therapy treatments and then will have a repeat CT scan and will return to see Dr. Fong mid July for further evaluation.    Mrs. Rosales says [place]  She said she never wore a hat and never wore sunscreen.  She is blue-eyed fair skinned.  No one else in her family has had serious skin cancers.    She is otherwise a very vibrant woman.  She is able to perform all of her activities of daily living.  She does not have significant pain.  She has not lost any weight.  She is not aware of any adenopathy.  She has had no bleeding or infection.  She has aching pain from her arthritis.  She denies numbness and tingling.    She says that  she was told by someone that the Cemiplimab will take care of both the squamous cell carcinoma on her vertex of her scalp and also the melanoma on her cheek.  I told her that I did not think that that was the case.    4/14/2025.  Laboratory data showed that her white count was 13.1 hemoglobin 14.4 hematocrit 44.8 with a platelet count of 430,000 with a normal white blood cell differential.  CMP was normal except for an EGFR of 56.  Hepatitis B antigen and antibody were nonreactive as was the core antigen.  Cortisol was normal.  LDH is normal.  ACTH was normal.  TSH was normal.    4/25/2025.  She is here with her daughter to start her treatment.  She says that her appetite is good and her weight is stable.  She has no new lumps or bumps.  She is glad to get this started.  We reviewed some of the most common side effects of this treatment and she knows to ask for help and not to hide her symptoms.  We reviewed her normal laboratory data.  She has had no signs of infection and no signs of bleeding.  They had no new questions.  We said that her elevated white count is probably reactive.  We made sure that she had antinausea medicines available.  We will see her again in about 3 weeks.  She knows to call if she has any problems.    5/16/2025.  She is here today for her next infusion of Cemiplimab.  She did well last time except for a rash that she had on her chest.  She has sent me a picture of it.  In retrospect she said that she had changed the dryer sheets that she used with her wash and she thinks that that might have been the cause of the rash.  The rash subsequently subsided without any significant treatment.  She had no other ill effects.    She said that the mass seems smaller she said that there is a separation of the mass on her head into 2 pieces.  She was encouraged not to pick at it.  She says that it is not painful.  She has had sweats but this is not a change she has had no fevers or chills or bleeding.   She has had no diarrhea or change in her bowel habits.  Her appetite is good and her weight is stable.  She wondered if this would do anything for her melanoma on her face and I told her that this drug is not indicated for melanoma per se.    She is here today with her daughter.  Multiple questions were answered to her satisfaction.  They have an appointment to go back and see Dr. Fong at [facility] in July.  She will have her treatment today.  We will see her again in 3 weeks.  She was encouraged to call if she has any problems.    6/4/2025.  Laboratory data showed a sugar of 146 BUN of 27 creatinine 1.16 and EGFR 40.  Liver function test were normal.  Today we discussed that she needs to keep hydrated and she verbalized that she does drink several bottles of water a day.  We recommended that she use sports drinks.  She does drink several cups of coffee in the morning.  I told her that that was all right but she needed to continue to push fluids.  It is possible that we may be seeing some toxicity from the Cemiplimab.  I think that it would be all right for her to go forward with her treatment today.    She says that she has a lot of irritation and at the site of the mass on her head.  It has shrunk but it has not fallen off yet.  She says that it is difficult for her to comb her hair and occasionally she will irritate it.  There looks like there might have been some minor bleeding.  Otherwise she has tolerated the medication well.  She has no pain.  Her appetite is good her weight is stable.  She has had no problems with fevers or other signs of infection she does have sweats.  She has had minimal bleeding as noted above.  She is here today with her daughter who affirms this history.  We will see her again in 3 weeks.    Interval Note   6/25/2025.  Laboratory data shows a sugar of 115 BUN of 25 and creatinine of 1.10.  The set of kidney function test is improved from her last set.  Her EGFR has increased from 48-51 but  was not as high as 56.  Liver function tests are normal.  Total protein is normal.  CBC is normal.    6/27/2025.  She is here with her daughter to continue treatment.  She is pleased that the lesion has fallen off.  She had to disentangle it from her hair.  She says that it is not painful.  She has had a good appetite.  Her weight is stable.  She has had sweats but no diarrhea or rashes shortness of breath or other signs of adverse events secondary to her immunotherapy.  We encouraged her to continue hydration and to exercise to the best of her ability.    She already has appointments to be reevaluated for her disease and to see Dr. Fong.  She has another infusion of cemiplimab coming up on 7/18.  We will go ahead with her treatment today.    Comorbidities.  Hypertension  Macular degeneration  Tremor  History of C. difficile  History of 2 shoulder replacements  Hysterectomy  Cholecystectomy  Tonsillectomy and adenoidectomy  Osteoporosis  Peripheral neuropathy  Osteoarthritis  Urge incontinence  CKD, seen by Dr. Tomas after a bout her EL related to her surgery.  GERD  Aneurysm of the ascending aorta  Colon polyps  Hyperlipidemia  Fractured fingers 2016.  Chronic gastritis and gastric fundal polyps 2018  Diverticular disease  Stress test in 2022 showed normal perfusion and left ventricular ejection fraction estimated at greater than 65%    Monitoring Parameters  Histories and physical examinations CAT scans and PET scans    Review of Systems - Oncology   Review of Systems   Constitutional:  Positive for diaphoresis (mild) and fatigue. Negative for appetite change and unexpected weight change.   HENT:  Negative for dental problem, hearing loss, mouth sores and trouble swallowing.    Eyes:  Negative for visual disturbance.   Respiratory:  Negative for cough and shortness of breath.    Cardiovascular:  Negative for leg swelling.   Gastrointestinal:  Negative for abdominal pain, constipation, diarrhea, nausea and  vomiting.   Endocrine: Negative for polyuria.   Genitourinary:  Negative for dysuria, frequency and urgency.   Musculoskeletal:  Positive for arthralgias and joint swelling.   Skin:  Positive for pallor and wound (areqa on back of her head). Negative for rash.   Neurological:  Negative for weakness, light-headedness, numbness and headaches.   Hematological:  Negative for adenopathy. Does not bruise/bleed easily.   Psychiatric/Behavioral:  Negative for self-injury, sleep disturbance and suicidal ideas. The patient is not nervous/anxious.    She has had a tremor for about a year.  She said it was similar to the tremor that her father had.  She has had a history of pneumonia several times but has not required hospitalization.  She says that her sleep is interrupted by the need to urinate.    Past Medical History  Past Medical History:   Diagnosis Date    Aneurysm of the ascending aorta, without rupture 03/01/2022    Aneurysm, ascending aorta    Chronic kidney disease, stage 2 (mild) 05/26/2020    Stage 2 chronic kidney disease    Hypertension ? 20q3    Leukemia (Multi)     Osteoporosis ?2014    Personal history of other (healed) physical injury and trauma     History of injury of neck    Personal history of other diseases of the digestive system     History of gastroesophageal reflux (GERD)    Personal history of other infectious and parasitic diseases     History of Clostridioides difficile infection    Personal history of other medical treatment     H/O complete eye exam    Personal history of other medical treatment     History of mammogram    Personal history of other medical treatment     H/O bone density study    Personal history of other medical treatment     History of bone scan        Surgical History  Past Surgical History:   Procedure Laterality Date    CATARACT EXTRACTION      CHOLECYSTECTOMY  1995?    COLONOSCOPY  03/23/2023    DR. PEREZ - REPEAT 3 YEARS. FINAL DIAGNOSIS A.  ASCENDING COLON POLYP:  TUBULAR ADENOMA.  B.  TRANSVERSE COLON POLYP: TUBULAR ADENOMA.  C.  SIGMOID COLON POLYP: TUBULOVILLOUS ADENOMA.    ESOPHAGOGASTRODUODENOSCOPY  06/28/2018    HERNIA REPAIR  2013    HYSTERECTOMY  1990    KNEE SURGERY      OOPHORECTOMY  1999    TOTAL SHOULDER ARTHROPLASTY Left 12/29/2014    TUBAL LIGATION  1981   Nissen fundoplication and hiatal hernia repair 2019.    Social History  Social History     Tobacco Use    Smoking status: Never    Smokeless tobacco: Never   Vaping Use    Vaping status: Never Used   Substance Use Topics    Alcohol use: Yes     Comment: OCCASSIONALLY    Drug use: Never        Family History  family history includes CARDIAC DISORDER in her sister; Diabetes type II in an other family member; Lung cancer in her father; No Known Problems in her mother.       Current Medications  Current Outpatient Medications   Medication Instructions    alendronate (FOSAMAX) 70 mg, oral, Every 7 days    amLODIPine (NORVASC) 5 mg, oral, Daily    ascorbic acid (Vitamin C) 500 mg tablet 1 tablet, Daily    atorvastatin (LIPITOR) 10 mg, oral, Nightly    calcium carbonate-vitamin D3 600 mg-20 mcg (800 unit) tablet 1 tablet, 2 times daily    DULoxetine (CYMBALTA) 30 mg, oral, Daily    [id number] (Nicotinamide, with chromium,) 500 mcg- 750 mg tablet 2 times daily    loratadine (Claritin) 10 mg tablet 1 tablet, Daily PRN    losartan (COZAAR) 100 mg, oral, Daily    multivit-min-iron-FA-lutein (Centrum Silver Women) 8 mg iron-400 mcg-300 mcg tablet 1 tablet, Daily    ondansetron (ZOFRAN) 8 mg, oral, 2 times daily PRN    vitamins A,C,E-zinc-copper (PreserVision AREDS) 2,148 mcg-113 mg-45 mg-17.4mg tablet 1 tablet, Daily           OARRS Review  I have personally reviewed the OARRS report for Monica Rosales. I have considered the risks of abuse, dependence, addiction and diversion.  Review of this record shows that she has no prescriptions for sedatives stimulants or opioids.    Vital Signs  /81    Pulse 82   Temp 36.2 °C (97.2 °F) (Skin)   Resp 16   Wt 81.7 kg (180 lb 3.2 oz)   SpO2 95%   BMI 33.25 kg/m²      Physical Exam  Vitals and nursing note reviewed. Exam conducted with a chaperone present.   Constitutional:       General: She is not in acute distress.     Appearance: She is not ill-appearing or toxic-appearing.      Comments: She is a well-developed well-nourished elderly white female who is very pleased that the large knot on top of her head has fallen off.  She does not appear to be acutely or chronically ill.  She is accompanied by her daughter Domonique.  She is alert, pleasant and engaged in conversation.  Lesions continue to gradually improve.  She is still bothered by the sensation of nodularity in her left cheek where her in situ melanoma was resected.   HENT:      Head: Normocephalic and atraumatic.      Comments: The lump on top of her head has fallen off.  The base is still nodular and thickened.  There is evidence of vascularity but no bleeding.    The posterior extension of the mass which is not as tall as the rest of the mass also continues to decrease.  The tail of this posterior component tapers off to the left almost in a triangle.     Nose: Nose normal.      Mouth/Throat:      Mouth: Mucous membranes are moist.      Pharynx: Oropharynx is clear. No oropharyngeal exudate or posterior oropharyngeal erythema.      Comments: She has an upper plate and she has 4 teeth in the bottom jaw 2 on each side.  Eyes:      General: No scleral icterus.     Extraocular Movements: Extraocular movements intact.      Conjunctiva/sclera: Conjunctivae normal.      Pupils: Pupils are equal, round, and reactive to light.   Neck:      Comments: She has no significant adenopathy in the head neck region, supraclavicular axillary or inguinal regions bilaterally.  Cardiovascular:      Rate and Rhythm: Normal rate and regular rhythm.      Heart sounds: Murmur (systolic murmur 3 out of 6) heard.   Pulmonary:     "  Effort: Pulmonary effort is normal. No respiratory distress.      Breath sounds: No wheezing, rhonchi or rales.   Abdominal:      General: There is no distension.      Palpations: Abdomen is soft. There is no mass.      Tenderness: There is no abdominal tenderness. There is no guarding or rebound.   Musculoskeletal:         General: Normal range of motion.      Cervical back: Normal range of motion. No rigidity or tenderness.      Right lower leg: No edema.      Left lower leg: No edema.   Lymphadenopathy:      Cervical: No cervical adenopathy.      Left cervical: No deep cervical adenopathy.   Skin:     General: Skin is warm and dry.      Coloration: Skin is pale. Skin is not jaundiced.      Findings: Lesion present.      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  It is hyperpigmented in an arc like shape and is minimally palpable.  It is stable.  At the lowest point in the scar, there is a palpable pinhead sized spot which she says was the site of a suture.    The original tumor \"fell off\" leaving a slightly tender erythematous area. Appears to have less nodularity at the base of the triangle area   Neurological:      Mental Status: She is alert and oriented to person, place, and time. Mental status is at baseline.      Cranial Nerves: Cranial nerve deficit present.      Sensory: Sensory deficit present.      Motor: No weakness.      Gait: Gait normal.      Comments: Somewhat hard of hearing.  She is able to get onto the examination table without any difficulty.   Psychiatric:         Mood and Affect: Mood normal.         Behavior: Behavior normal.         Thought Content: Thought content normal.         Judgment: Judgment normal.      Comments: She seems to be coping well.        Current Laboratory Data  See narrative for laboratory data    Recent Imaging  Imaging  See narrative for " imaging    Cardiology, Vascular, and Other Imaging  No other imaging results found for the past 7 days        Pathology  Squamous cell carcinoma of the top of her head this is a well-differentiated invasive squamous cell carcinoma which invades beyond subcutaneous fat with extensively positive margins.    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    Performance Status:  Asymptomatic    Assessment/Plan    1.  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  She has been prescribed Cemiplimab which will be given every 3 weeks.  She is to see Dr. Fong again mid July.    She is tolerating the treatment well.  The majority of the lesion has fallen off.    2.  Melanoma in situ.  The initial biopsies done on 1/20/2025 showed melanoma in situ in the medial skin of the left cheek with peripheral margin focally involved.  The deep margin appears to be uninvolved.  She has a pinhead sized palpable spot at the lowest point in her incision which she says was the site of skin suture.    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    Rebiopsy of the skin from the left medial cheek showed residual melanoma in situ done 2/27/2025.  The margins were focally involved.    This patient may need further surgery on her melanoma on her face.  Lesion has not changed and there is a thickness about mid scar.    3.  Multiple comorbidities.  These include but are not limited to:   Hypertension  Macular degeneration  Tremor  History of C. difficile  History of 2 shoulder replacements  Hysterectomy  Cholecystectomy  Tonsillectomy and adenoidectomy  Osteoporosis  Peripheral neuropathy  Osteoarthritis  Urge incontinence  CKD  GERD  Chronic gastritis and gastric fundal polyps 2018  Diverticular disease  Stress test in 2022 showed normal perfusion and left ventricular ejection fraction estimated at greater than  65%  Aneurysm of the ascending aorta  Colon polyps  Hyperlipidemia  Fractured fingers on the right side June 2016    We will give her her treatment today and we will see her again in 3 weeks.  She knows to call if she has any change in her status, questions or concerns.    Cathy Doshi MD

## 2025-06-27 NOTE — PROGRESS NOTES
Pt getting tx today  Next labs 7/17 at the hosp  Rtc 7/18 9am MD                 1000 Cemiplimab to follow  Reviewed AVS with patient- patient verbalizes understanding

## 2025-07-08 ENCOUNTER — APPOINTMENT (OUTPATIENT)
Dept: PRIMARY CARE | Facility: CLINIC | Age: 80
End: 2025-07-08
Payer: MEDICARE

## 2025-07-08 LAB
25(OH)D3+25(OH)D2 SERPL-MCNC: 47 NG/ML (ref 30–100)
ALBUMIN SERPL-MCNC: 4.4 G/DL (ref 3.6–5.1)
ALP SERPL-CCNC: 57 U/L (ref 37–153)
ALT SERPL-CCNC: 21 U/L (ref 6–29)
ANION GAP SERPL CALCULATED.4IONS-SCNC: 8 MMOL/L (CALC) (ref 7–17)
AST SERPL-CCNC: 25 U/L (ref 10–35)
BILIRUB SERPL-MCNC: 0.5 MG/DL (ref 0.2–1.2)
BUN SERPL-MCNC: 22 MG/DL (ref 7–25)
CALCIUM SERPL-MCNC: 10 MG/DL (ref 8.6–10.4)
CHLORIDE SERPL-SCNC: 101 MMOL/L (ref 98–110)
CHOLEST SERPL-MCNC: 142 MG/DL
CHOLEST/HDLC SERPL: 2.7 (CALC)
CO2 SERPL-SCNC: 29 MMOL/L (ref 20–32)
CREAT SERPL-MCNC: 1.02 MG/DL (ref 0.6–0.95)
EGFRCR SERPLBLD CKD-EPI 2021: 55 ML/MIN/1.73M2
EST. AVERAGE GLUCOSE BLD GHB EST-MCNC: 123 MG/DL
EST. AVERAGE GLUCOSE BLD GHB EST-SCNC: 6.8 MMOL/L
GLUCOSE SERPL-MCNC: 104 MG/DL (ref 65–99)
HBA1C MFR BLD: 5.9 %
HDLC SERPL-MCNC: 53 MG/DL
LDLC SERPL CALC-MCNC: 70 MG/DL (CALC)
NONHDLC SERPL-MCNC: 89 MG/DL (CALC)
POTASSIUM SERPL-SCNC: 4.5 MMOL/L (ref 3.5–5.3)
PROT SERPL-MCNC: 6.9 G/DL (ref 6.1–8.1)
SODIUM SERPL-SCNC: 138 MMOL/L (ref 135–146)
TRIGL SERPL-MCNC: 107 MG/DL

## 2025-07-14 ENCOUNTER — APPOINTMENT (OUTPATIENT)
Dept: PRIMARY CARE | Facility: CLINIC | Age: 80
End: 2025-07-14
Payer: MEDICARE

## 2025-07-14 VITALS
HEART RATE: 90 BPM | HEIGHT: 61 IN | DIASTOLIC BLOOD PRESSURE: 77 MMHG | WEIGHT: 180 LBS | SYSTOLIC BLOOD PRESSURE: 123 MMHG | BODY MASS INDEX: 33.99 KG/M2

## 2025-07-14 DIAGNOSIS — C44.42 SCC (SQUAMOUS CELL CARCINOMA), SCALP/NECK: ICD-10-CM

## 2025-07-14 DIAGNOSIS — I10 PRIMARY HYPERTENSION: ICD-10-CM

## 2025-07-14 DIAGNOSIS — Z12.31 ENCOUNTER FOR SCREENING MAMMOGRAM FOR BREAST CANCER: ICD-10-CM

## 2025-07-14 DIAGNOSIS — N18.31 STAGE 3A CHRONIC KIDNEY DISEASE (MULTI): ICD-10-CM

## 2025-07-14 DIAGNOSIS — Z00.00 ROUTINE GENERAL MEDICAL EXAMINATION AT HEALTH CARE FACILITY: Primary | ICD-10-CM

## 2025-07-14 DIAGNOSIS — E11.22 TYPE 2 DIABETES MELLITUS WITH STAGE 3A CHRONIC KIDNEY DISEASE, WITHOUT LONG-TERM CURRENT USE OF INSULIN (MULTI): ICD-10-CM

## 2025-07-14 DIAGNOSIS — E78.00 HYPERCHOLESTEROLEMIA: ICD-10-CM

## 2025-07-14 DIAGNOSIS — Z85.820 HISTORY OF MALIGNANT MELANOMA: ICD-10-CM

## 2025-07-14 DIAGNOSIS — N18.31 TYPE 2 DIABETES MELLITUS WITH STAGE 3A CHRONIC KIDNEY DISEASE, WITHOUT LONG-TERM CURRENT USE OF INSULIN (MULTI): ICD-10-CM

## 2025-07-14 DIAGNOSIS — E78.1 HYPERTRIGLYCERIDEMIA: ICD-10-CM

## 2025-07-14 PROBLEM — E11.9 DM2 (DIABETES MELLITUS, TYPE 2) (MULTI): Status: RESOLVED | Noted: 2023-04-18 | Resolved: 2025-07-14

## 2025-07-14 PROBLEM — E11.42 DIABETIC POLYNEUROPATHY ASSOCIATED WITH TYPE 2 DIABETES MELLITUS: Status: RESOLVED | Noted: 2024-07-30 | Resolved: 2025-07-14

## 2025-07-14 PROCEDURE — 1160F RVW MEDS BY RX/DR IN RCRD: CPT | Performed by: INTERNAL MEDICINE

## 2025-07-14 PROCEDURE — 1158F ADVNC CARE PLAN TLK DOCD: CPT | Performed by: INTERNAL MEDICINE

## 2025-07-14 PROCEDURE — 99214 OFFICE O/P EST MOD 30 MIN: CPT | Performed by: INTERNAL MEDICINE

## 2025-07-14 PROCEDURE — 1159F MED LIST DOCD IN RCRD: CPT | Performed by: INTERNAL MEDICINE

## 2025-07-14 PROCEDURE — 3074F SYST BP LT 130 MM HG: CPT | Performed by: INTERNAL MEDICINE

## 2025-07-14 PROCEDURE — 1036F TOBACCO NON-USER: CPT | Performed by: INTERNAL MEDICINE

## 2025-07-14 PROCEDURE — 1170F FXNL STATUS ASSESSED: CPT | Performed by: INTERNAL MEDICINE

## 2025-07-14 PROCEDURE — G0439 PPPS, SUBSEQ VISIT: HCPCS | Performed by: INTERNAL MEDICINE

## 2025-07-14 PROCEDURE — 3078F DIAST BP <80 MM HG: CPT | Performed by: INTERNAL MEDICINE

## 2025-07-14 ASSESSMENT — ACTIVITIES OF DAILY LIVING (ADL)
BATHING: INDEPENDENT
DOING_HOUSEWORK: INDEPENDENT
GROCERY_SHOPPING: INDEPENDENT
TAKING_MEDICATION: INDEPENDENT
MANAGING_FINANCES: INDEPENDENT
DRESSING: INDEPENDENT

## 2025-07-14 ASSESSMENT — ENCOUNTER SYMPTOMS
CHILLS: 0
PSYCHIATRIC NEGATIVE: 1
ADENOPATHY: 0
BACK PAIN: 0
WHEEZING: 0
DYSURIA: 0
CONSTITUTIONAL NEGATIVE: 1
GASTROINTESTINAL NEGATIVE: 1
HEMATOLOGIC/LYMPHATIC NEGATIVE: 1
LIGHT-HEADEDNESS: 0
ABDOMINAL DISTENTION: 0
HEADACHES: 0
FEVER: 0
VOMITING: 0
CARDIOVASCULAR NEGATIVE: 1
SHORTNESS OF BREATH: 0
EYES NEGATIVE: 1
NEUROLOGICAL NEGATIVE: 1
AGITATION: 0
CONFUSION: 0
ENDOCRINE NEGATIVE: 1
MUSCULOSKELETAL NEGATIVE: 1
PALPITATIONS: 0
COUGH: 0
EYE DISCHARGE: 0
DIARRHEA: 0
JOINT SWELLING: 0
NAUSEA: 0
NECK STIFFNESS: 0
ABDOMINAL PAIN: 0
ALLERGIC/IMMUNOLOGIC NEGATIVE: 1
CONSTIPATION: 0
RESPIRATORY NEGATIVE: 1
NUMBNESS: 0

## 2025-07-14 NOTE — PROGRESS NOTES
"Subjective   Reason for Visit: Monica Rosales is an 80 y.o. female here for a Medicare Wellness visit.     Past Medical, Surgical, and Family History reviewed and updated in chart.    Reviewed all medications by prescribing practitioner or clinical pharmacist (such as prescriptions, OTCs, herbal therapies and supplements) and documented in the medical record.    Here for fu with labs  Skin lesion on scalp removed SCC( immunotherapy )  Skin lesion on face melanoma        Patient Care Team:  Loyd Hampton MD as PCP - General  Loyd Hampton MD as PCP - [employer] Advantage PCP  Loyd Hampton MD as PCP - [employer] Medicare Advantage PCP  Cathy Doshi MD as Consulting Physician (Hematology and Oncology)     Review of Systems   Constitutional: Negative.  Negative for chills and fever.   HENT: Negative.  Negative for congestion.    Eyes: Negative.  Negative for discharge.   Respiratory: Negative.  Negative for cough, shortness of breath and wheezing.    Cardiovascular: Negative.  Negative for chest pain, palpitations and leg swelling.   Gastrointestinal: Negative.  Negative for abdominal distention, abdominal pain, constipation, diarrhea, nausea and vomiting.   Endocrine: Negative.    Genitourinary: Negative.  Negative for dysuria and urgency.   Musculoskeletal: Negative.  Negative for back pain, joint swelling and neck stiffness.   Skin: Negative.  Negative for rash.   Allergic/Immunologic: Negative.  Negative for immunocompromised state.   Neurological: Negative.  Negative for light-headedness, numbness and headaches.   Hematological: Negative.  Negative for adenopathy.   Psychiatric/Behavioral: Negative.  Negative for agitation, behavioral problems and confusion.    All other systems reviewed and are negative.      Objective   Vitals:  /77 (BP Location: Left arm, Patient Position: Sitting)   Pulse 90   Ht (!) 1.549 m (5' 1\")   Wt 81.6 kg (180 lb)   BMI 34.01 kg/m²       Physical " Exam  Vitals reviewed.   Constitutional:       General: She is not in acute distress.     Appearance: Normal appearance.   HENT:      Head: Normocephalic and atraumatic.      Nose: Nose normal.   Eyes:      Conjunctiva/sclera: Conjunctivae normal.      Pupils: Pupils are equal, round, and reactive to light.   Neck:      Vascular: No carotid bruit.   Cardiovascular:      Rate and Rhythm: Normal rate and regular rhythm.      Pulses: Normal pulses.      Heart sounds:      No gallop.   Pulmonary:      Effort: Pulmonary effort is normal. No respiratory distress.      Breath sounds: Normal breath sounds. No wheezing.   Abdominal:      General: Bowel sounds are normal.      Palpations: Abdomen is soft.      Tenderness: There is no abdominal tenderness.   Musculoskeletal:         General: Normal range of motion.      Cervical back: Normal range of motion. No rigidity.   Lymphadenopathy:      Cervical: No cervical adenopathy.   Skin:     General: Skin is warm.      Findings: Lesion (residual lesion on sclap will see [facility] for that again( same area of previous removal)) present. No rash.   Neurological:      General: No focal deficit present.      Mental Status: She is alert and oriented to person, place, and time.   Psychiatric:         Mood and Affect: Mood normal.         Behavior: Behavior normal.         Assessment & Plan  Routine general medical examination at health care facility    Orders:    1 Year Follow Up In Primary Care - Wellness Exam; Future    Primary hypertension    Orders:    Comprehensive Metabolic Panel; Future    Stage 3a chronic kidney disease (Multi)    Orders:    Comprehensive Metabolic Panel; Future    Vitamin D 25-Hydroxy,Total (for eval of Vitamin D levels); Future    Hypercholesterolemia    Orders:    Comprehensive Metabolic Panel; Future    Hypertriglyceridemia    Orders:    Comprehensive Metabolic Panel; Future    Encounter for screening mammogram for breast cancer    Orders:    BI mammo  bilateral screening tomosynthesis; Future    Comprehensive Metabolic Panel; Future    Type 2 diabetes mellitus with stage 3a chronic kidney disease, without long-term current use of insulin (Multi)    Orders:    Hemoglobin A1C; Future    Albumin-Creatinine Ratio, Urine Random; Future    SCC (squamous cell carcinoma), scalp/neck         History of malignant melanoma              Advanced Care Planing discussed, diagnosis , treatment and prognosis discussed with pt ,pt has capacity to make own decision, pt has a living will, to bring a copy for the chart.      Diabetes Mellitus/IFG addressed as follow:    1800 DEEPAK ADA  HGA1C GOAL LESS THAN 7  LOSE WT  EXERCISE DAILY    Labs reviewed with pt      Chronic kidney disease addressed as follow:    AVOID NSAIDS  INCREASE FLUID INTAKE    Clinically doing fine    See [facility] for SCC    Melanoma face already removed by plastic surgeon      Fu 6 mo bw

## 2025-07-16 ENCOUNTER — APPOINTMENT (OUTPATIENT)
Dept: PRIMARY CARE | Facility: CLINIC | Age: 80
End: 2025-07-16
Payer: MEDICARE

## 2025-07-16 ENCOUNTER — LAB (OUTPATIENT)
Dept: LAB | Facility: HOSPITAL | Age: 80
End: 2025-07-16
Payer: MEDICARE

## 2025-07-16 LAB
ALBUMIN SERPL BCP-MCNC: 4 G/DL (ref 3.4–5)
ALP SERPL-CCNC: 57 U/L (ref 33–136)
ALT SERPL W P-5'-P-CCNC: 23 U/L (ref 7–45)
ANION GAP SERPL CALC-SCNC: 11 MMOL/L (ref 10–20)
AST SERPL W P-5'-P-CCNC: 26 U/L (ref 9–39)
BASOPHILS # BLD AUTO: 0.04 X10*3/UL (ref 0–0.1)
BASOPHILS NFR BLD AUTO: 0.4 %
BILIRUB SERPL-MCNC: 0.7 MG/DL (ref 0–1.2)
BUN SERPL-MCNC: 24 MG/DL (ref 6–23)
CALCIUM SERPL-MCNC: 9.2 MG/DL (ref 8.6–10.3)
CHLORIDE SERPL-SCNC: 102 MMOL/L (ref 98–107)
CO2 SERPL-SCNC: 28 MMOL/L (ref 21–32)
CREAT SERPL-MCNC: 1.15 MG/DL (ref 0.5–1.05)
EGFRCR SERPLBLD CKD-EPI 2021: 48 ML/MIN/1.73M*2
EOSINOPHIL # BLD AUTO: 0.11 X10*3/UL (ref 0–0.4)
EOSINOPHIL NFR BLD AUTO: 1.2 %
ERYTHROCYTE [DISTWIDTH] IN BLOOD BY AUTOMATED COUNT: 13.6 % (ref 11.5–14.5)
GLUCOSE SERPL-MCNC: 121 MG/DL (ref 74–99)
HCT VFR BLD AUTO: 43.2 % (ref 36–46)
HGB BLD-MCNC: 13.9 G/DL (ref 12–16)
IMM GRANULOCYTES # BLD AUTO: 0.02 X10*3/UL (ref 0–0.5)
IMM GRANULOCYTES NFR BLD AUTO: 0.2 % (ref 0–0.9)
LDH SERPL L TO P-CCNC: 150 U/L (ref 84–246)
LYMPHOCYTES # BLD AUTO: 2.78 X10*3/UL (ref 0.8–3)
LYMPHOCYTES NFR BLD AUTO: 30.1 %
MCH RBC QN AUTO: 32.4 PG (ref 26–34)
MCHC RBC AUTO-ENTMCNC: 32.2 G/DL (ref 32–36)
MCV RBC AUTO: 101 FL (ref 80–100)
MONOCYTES # BLD AUTO: 0.76 X10*3/UL (ref 0.05–0.8)
MONOCYTES NFR BLD AUTO: 8.2 %
NEUTROPHILS # BLD AUTO: 5.52 X10*3/UL (ref 1.6–5.5)
NEUTROPHILS NFR BLD AUTO: 59.9 %
NRBC BLD-RTO: 0 /100 WBCS (ref 0–0)
PLATELET # BLD AUTO: 445 X10*3/UL (ref 150–450)
POTASSIUM SERPL-SCNC: 4.3 MMOL/L (ref 3.5–5.3)
PROT SERPL-MCNC: 5.8 G/DL (ref 6.4–8.2)
RBC # BLD AUTO: 4.29 X10*6/UL (ref 4–5.2)
SODIUM SERPL-SCNC: 137 MMOL/L (ref 136–145)
WBC # BLD AUTO: 9.2 X10*3/UL (ref 4.4–11.3)

## 2025-07-16 PROCEDURE — 80053 COMPREHEN METABOLIC PANEL: CPT

## 2025-07-16 PROCEDURE — 85025 COMPLETE CBC W/AUTO DIFF WBC: CPT

## 2025-07-16 PROCEDURE — 83615 LACTATE (LD) (LDH) ENZYME: CPT

## 2025-07-17 ENCOUNTER — HOSPITAL ENCOUNTER (OUTPATIENT)
Dept: RADIOLOGY | Facility: HOSPITAL | Age: 80
Discharge: HOME | End: 2025-07-17
Payer: MEDICARE

## 2025-07-17 DIAGNOSIS — Z12.31 ENCOUNTER FOR SCREENING MAMMOGRAM FOR BREAST CANCER: ICD-10-CM

## 2025-07-17 PROCEDURE — 77063 BREAST TOMOSYNTHESIS BI: CPT | Performed by: RADIOLOGY

## 2025-07-17 PROCEDURE — 77067 SCR MAMMO BI INCL CAD: CPT | Performed by: RADIOLOGY

## 2025-07-17 PROCEDURE — 77067 SCR MAMMO BI INCL CAD: CPT

## 2025-07-18 ENCOUNTER — OFFICE VISIT (OUTPATIENT)
Dept: HEMATOLOGY/ONCOLOGY | Facility: CLINIC | Age: 80
End: 2025-07-18
Payer: MEDICARE

## 2025-07-18 ENCOUNTER — INFUSION (OUTPATIENT)
Dept: HEMATOLOGY/ONCOLOGY | Facility: CLINIC | Age: 80
End: 2025-07-18
Payer: MEDICARE

## 2025-07-18 VITALS
OXYGEN SATURATION: 97 % | SYSTOLIC BLOOD PRESSURE: 110 MMHG | HEART RATE: 81 BPM | TEMPERATURE: 96.8 F | RESPIRATION RATE: 16 BRPM | DIASTOLIC BLOOD PRESSURE: 78 MMHG | WEIGHT: 181.4 LBS | BODY MASS INDEX: 34.28 KG/M2

## 2025-07-18 DIAGNOSIS — C44.42 SQUAMOUS CELL CARCINOMA OF SCALP: Primary | ICD-10-CM

## 2025-07-18 DIAGNOSIS — C44.42 SQUAMOUS CELL CARCINOMA OF SCALP: ICD-10-CM

## 2025-07-18 PROCEDURE — 1159F MED LIST DOCD IN RCRD: CPT | Performed by: INTERNAL MEDICINE

## 2025-07-18 PROCEDURE — 96413 CHEMO IV INFUSION 1 HR: CPT

## 2025-07-18 PROCEDURE — 3078F DIAST BP <80 MM HG: CPT | Performed by: INTERNAL MEDICINE

## 2025-07-18 PROCEDURE — 3074F SYST BP LT 130 MM HG: CPT | Performed by: INTERNAL MEDICINE

## 2025-07-18 PROCEDURE — 2500000004 HC RX 250 GENERAL PHARMACY W/ HCPCS (ALT 636 FOR OP/ED): Mod: JZ | Performed by: INTERNAL MEDICINE

## 2025-07-18 PROCEDURE — 99213 OFFICE O/P EST LOW 20 MIN: CPT | Performed by: INTERNAL MEDICINE

## 2025-07-18 PROCEDURE — 1126F AMNT PAIN NOTED NONE PRSNT: CPT | Performed by: INTERNAL MEDICINE

## 2025-07-18 RX ORDER — PROCHLORPERAZINE MALEATE 5 MG
10 TABLET ORAL EVERY 6 HOURS PRN
Status: CANCELLED | OUTPATIENT
Start: 2025-07-18

## 2025-07-18 RX ORDER — ALBUTEROL SULFATE 0.83 MG/ML
3 SOLUTION RESPIRATORY (INHALATION) AS NEEDED
Status: CANCELLED | OUTPATIENT
Start: 2025-07-18

## 2025-07-18 RX ORDER — FAMOTIDINE 10 MG/ML
20 INJECTION, SOLUTION INTRAVENOUS ONCE AS NEEDED
Status: CANCELLED | OUTPATIENT
Start: 2025-07-18

## 2025-07-18 RX ORDER — EPINEPHRINE 0.3 MG/.3ML
0.3 INJECTION SUBCUTANEOUS EVERY 5 MIN PRN
Status: CANCELLED | OUTPATIENT
Start: 2025-07-18

## 2025-07-18 RX ORDER — DIPHENHYDRAMINE HYDROCHLORIDE 50 MG/ML
50 INJECTION, SOLUTION INTRAMUSCULAR; INTRAVENOUS AS NEEDED
Status: CANCELLED | OUTPATIENT
Start: 2025-07-18

## 2025-07-18 RX ORDER — PROCHLORPERAZINE EDISYLATE 5 MG/ML
10 INJECTION INTRAMUSCULAR; INTRAVENOUS EVERY 6 HOURS PRN
Status: CANCELLED | OUTPATIENT
Start: 2025-07-18

## 2025-07-18 RX ADMIN — CEMIPLIMAB-RWLC 350 MG: 50 INJECTION INTRAVENOUS at 09:46

## 2025-07-18 ASSESSMENT — ENCOUNTER SYMPTOMS
NAUSEA: 0
FATIGUE: 1
FREQUENCY: 0
DYSURIA: 0
CONSTIPATION: 0
UNEXPECTED WEIGHT CHANGE: 0
ABDOMINAL PAIN: 0
NERVOUS/ANXIOUS: 0
APPETITE CHANGE: 0
LIGHT-HEADEDNESS: 0
TROUBLE SWALLOWING: 0
COUGH: 0
ADENOPATHY: 0
NUMBNESS: 0
JOINT SWELLING: 1
DIAPHORESIS: 1
SLEEP DISTURBANCE: 0
HEADACHES: 0
BRUISES/BLEEDS EASILY: 0
ARTHRALGIAS: 1
SHORTNESS OF BREATH: 0
WEAKNESS: 0
DIARRHEA: 0
VOMITING: 0

## 2025-07-18 ASSESSMENT — PAIN SCALES - GENERAL: PAINLEVEL_OUTOF10: 0-NO PAIN

## 2025-07-18 NOTE — PATIENT INSTRUCTIONS
Reviewed labs and recent medical history.  Discussed her lesions and that they continue to improve.  She will follow up with Dr. Fong at the end of the month.  Okay for her treatment today, 7/18/25  Return to see MD on August 6th and then treatment on Friday (3) weeks for possible next treatment.

## 2025-07-18 NOTE — PROGRESS NOTES
Patient ID:Monica Rosales is a 80 y.o. 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 Rosales has been found to have rapidly growing squamous cell carcinoma of the vertex of the skull.  She had another one close by that was surgically excised.  This lesion continues to grow.  She was seen by Dr. Cinthia Fong the [facility] who recommended treatment with cemiplimab.  She is here to get treatment closer to home.  The melanoma of the left cheek of her face has been resected twice with positive margins.  It is in situ and margins are positive.    Chronological History  11/5/2024.  Saw Dr. Hampton in follow-up of head trauma after a box falling on her face.  He described a hyperpigmented lesion on her face and referred her to dermatology.    12/17/2024.  Saw Dr. Hampton for a scalp lesion that would not heal.  She said that the lesion had been present for couple months and was not painful.  She said that she put cream on it but it did not respond.  She said it started out tiny but very fast.  He described as a nodular skin lesion with central erosion 1 inch in diameter.  He referred her to plastic surgery.  She was referred see the dermatologist on 1/8/2025 at [facility].    1/6/2025.  Saw Dr. Hampton in follow-up for sinusitis and was given a prescription for oral antibiotics with azithromycin.    1/20/2025.  She had surgery that [facility] including an excision of her left cheek medially x 2 and a shave biopsy of the central occipital scalp.  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    Maximum tumor thickness was 0.1 mm.  No mitoses were seen and there was no lymphovascular invasion.  Tumor infiltrating lymphocytes were seen but nonbrisk.  There was no evidence of invasive melanoma.  No lymph nodes were submitted or found.  This is a pT1a lesion.    The lesion on the central scalp is invasive squamous cell carcinoma well-differentiated and was at least 1.7 cm with a depth of invasion of at least 6 mm.  Perineural invasion was not identified.  The deep and peripheral margins were extensively positive for carcinoma.    2/24/2025 CAT scans of the head and soft tissues of the neck showed no parenchymal or leptomeningeal enhancement.  There is an enhancing lobulated soft tissue lesion in the right parietal scalp at the vertex measuring 2.6 x 2.5 x 1.0 which contacts the underlying calvarium without arlyn involvement.  There is no intracranial extension.    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  Retropharyngeal and prevertebral soft tissues were normal.  There is no significant cervical adenopathy by size criteria but there were a few rounded nonspecific enhancing suboccipital lymph nodes measuring up to 0.6 cm on the right side.  Thyroid was unremarkable as were the parotids, submandibular glands, paranasal sinuses and mastoids.  There was evidence of bilateral lens replacements and degenerative change of the cervical spine was seen.  There is no evidence of high-grade spinal stenosis.    2/26/2025.  Dr. Sterling Padgett submitted another biopsy of the left medial skin of the cheek which showed residual melanoma in situ.  This was reviewed at the [facility] and is said to be a challenging case with features consistent with residual melanoma in situ.    2/28/2025.  [facility] was contacted from [place]  plastic surgery referred to Dr. Fong from Dr. Fernandez Stone for melanoma.    3/3/2025.  DEXA scan showed osteoporosis.    3/6/2025.  Surgical pathology on skin from left medial cheek showed residual melanoma in situ.  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  The provider was Margaret Young MD sending the specimen to [facility] for consultation.    3/31/2025.  Surgical pathology at [facility] reviewed the specimens from 1/20/2025 and those from the second biopsy.  The diagnoses do not appear to have been changed.    4/2/2025.  Patient was referred to Dr. Cinthia Fong at [facility].  She was referred to local oncology for Cemiplimab.  She was to return to [place] mid July with repeat CT scans.    4/14/2025.  She is here today with her daughter Domonique.  She says that the lesion on the top of her head continues to grow.  She says that sometimes there is a drainage from it.  It is usually not painful but it is annoying and sometimes it itches.  She says it feels as if there is a weight on her head.  She said that she cannot really cover it up with her hair.  She wonders if she can use hair products to cover it up better.  Her daughter encouraged her to just leave it alone.    She knows that she will undergo a series of intravenous therapy treatments and then will have a repeat CT scan and will return to see Dr. Fong mid July for further evaluation.    Mrs. Rosales [place]  She said she never wore a hat and never wore sunscreen.  She is blue-eyed fair skinned.  No one else in her family has had serious skin cancers.    She is otherwise a very vibrant woman.  She is able to perform all of her activities of daily living.  She does not have significant pain.  She has not lost any weight.  She is not aware of any adenopathy.  She has had no bleeding or infection.  She has aching pain from her arthritis.  She denies numbness and tingling.    She says that  she was told by someone that the Cemiplimab will take care of both the squamous cell carcinoma on her vertex of her scalp and also the melanoma on her cheek.  I told her that I did not think that that was the case.    4/14/2025.  Laboratory data showed that her white count was 13.1 hemoglobin 14.4 hematocrit 44.8 with a platelet count of 430,000 with a normal white blood cell differential.  CMP was normal except for an EGFR of 56.  Hepatitis B antigen and antibody were nonreactive as was the core antigen.  Cortisol was normal.  LDH is normal.  ACTH was normal.  TSH was normal.    4/25/2025.  She is here with her daughter to start her treatment.  She says that her appetite is good and her weight is stable.  She has no new lumps or bumps.  She is glad to get this started.  We reviewed some of the most common side effects of this treatment and she knows to ask for help and not to hide her symptoms.  We reviewed her normal laboratory data.  She has had no signs of infection and no signs of bleeding.  They had no new questions.  We said that her elevated white count is probably reactive.  We made sure that she had antinausea medicines available.  We will see her again in about 3 weeks.  She knows to call if she has any problems.    5/16/2025.  She is here today for her next infusion of Cemiplimab.  She did well last time except for a rash that she had on her chest.  She has sent me a picture of it.  In retrospect she said that she had changed the dryer sheets that she used with her wash and she thinks that that might have been the cause of the rash.  The rash subsequently subsided without any significant treatment.  She had no other ill effects.    She said that the mass seems smaller she said that there is a separation of the mass on her head into 2 pieces.  She was encouraged not to pick at it.  She says that it is not painful.  She has had sweats but this is not a change she has had no fevers or chills or bleeding.   She has had no diarrhea or change in her bowel habits.  Her appetite is good and her weight is stable.  She wondered if this would do anything for her melanoma on her face and I told her that this drug is not indicated for melanoma per se.    She is here today with her daughter.  Multiple questions were answered to her satisfaction.  They have an appointment to go back and see Dr. Fong at [facility] in July.  She will have her treatment today.  We will see her again in 3 weeks.  She was encouraged to call if she has any problems.    6/4/2025.  Laboratory data showed a sugar of 146 BUN of 27 creatinine 1.16 and EGFR 40.  Liver function test were normal.  Today we discussed that she needs to keep hydrated and she verbalized that she does drink several bottles of water a day.  We recommended that she use sports drinks.  She does drink several cups of coffee in the morning.  I told her that that was all right but she needed to continue to push fluids.  It is possible that we may be seeing some toxicity from the Cemiplimab.  I think that it would be all right for her to go forward with her treatment today.    She says that she has a lot of irritation and at the site of the mass on her head.  It has shrunk but it has not fallen off yet.  She says that it is difficult for her to comb her hair and occasionally she will irritate it.  There looks like there might have been some minor bleeding.  Otherwise she has tolerated the medication well.  She has no pain.  Her appetite is good her weight is stable.  She has had no problems with fevers or other signs of infection she does have sweats.  She has had minimal bleeding as noted above.  She is here today with her daughter who affirms this history.  We will see her again in 3 weeks.     6/25/2025.  Laboratory data shows a sugar of 115 BUN of 25 and creatinine of 1.10.  The set of kidney function test is improved from her last set.  Her EGFR has increased from 48-51 but was not as  high as 56.  Liver function tests are normal.  Total protein is normal.  CBC is normal.    6/27/2025.  She is here with her daughter to continue treatment.  She is pleased that the lesion has fallen off.  She had to disentangle it from her hair.  She says that it is not painful.  She has had a good appetite.  Her weight is stable.  She has had sweats but no diarrhea or rashes shortness of breath or other signs of adverse events secondary to her immunotherapy.  We encouraged her to continue hydration and to exercise to the best of her ability.    She already has appointments to be reevaluated for her disease and to see Dr. Fong.  She has another infusion of cemiplimab coming up on 7/18.  We will go ahead with her treatment today.    Interval Note  7/14/2025.  Followed up with Dr. Hampton for her annual Medicare wellness visit at which time he reviewed her chronic conditions..  Multiple blood tests were ordered.    7/16/2025.  Laboratory data showed a sugar of 121 BUN of 24 creatinine 1.15 with an EGFR of 48.  The rest of her chemistries were normal.  CBC was normal with the exception of an MCV of 101.  Absolute neutrophil count was 5.52.    7/17/2025.  Mammogram showed no evidence of malignancy.    7/18/2025.  She is here today accompanied by her daughter.  The lesion on her head continues to improve.  She still has a tumor present which appears to be smaller.  The base of the original lesion is still bumpy.  She is due for treatment today.  She has had no untoward side effects.  She has a good sense of wellbeing.  She currently weighs 181 pounds.  This is a weight gain of 1 pound.    She has upcoming reevaluation and appointment with Dr. Fong.  We will see her again in 3 weeks.    Comorbidities.  Hypertension  Macular degeneration  Tremor  History of C. difficile  History of 2 shoulder replacements  Hysterectomy  Cholecystectomy  Tonsillectomy and adenoidectomy  Osteoporosis  Peripheral  neuropathy  Osteoarthritis  Urge incontinence  CKD, seen by Dr. Tomas after a bout her EL related to her surgery.  GERD  Aneurysm of the ascending aorta  Colon polyps  Hyperlipidemia  Fractured fingers 2016.  Chronic gastritis and gastric fundal polyps 2018  Diverticular disease  Stress test in 2022 showed normal perfusion and left ventricular ejection fraction estimated at greater than 65%    Monitoring Parameters  Histories and physical examinations CAT scans and PET scans    Review of Systems - Oncology   Review of Systems   Constitutional:  Positive for diaphoresis (mild) and fatigue. Negative for appetite change and unexpected weight change.   HENT:  Negative for dental problem, hearing loss, mouth sores and trouble swallowing.    Eyes:  Negative for visual disturbance.   Respiratory:  Negative for cough and shortness of breath.    Cardiovascular:  Negative for leg swelling.   Gastrointestinal:  Negative for abdominal pain, constipation, diarrhea, nausea and vomiting.   Endocrine: Negative for polyuria.   Genitourinary:  Negative for dysuria, frequency and urgency.   Musculoskeletal:  Positive for arthralgias and joint swelling.   Skin:  Positive for pallor and wound (area on back of her head). Negative for rash.   Neurological:  Negative for weakness, light-headedness, numbness and headaches.   Hematological:  Negative for adenopathy. Does not bruise/bleed easily.   Psychiatric/Behavioral:  Negative for self-injury, sleep disturbance and suicidal ideas. The patient is not nervous/anxious.    She has had a tremor for about a year.  She said it was similar to the tremor that her father had.  She has had a history of pneumonia several times but has not required hospitalization.  She says that her sleep is interrupted by the need to urinate.    Past Medical History  Past Medical History:   Diagnosis Date    Allergic     Aneurysm of the ascending aorta, without rupture 03/01/2022    Aneurysm, ascending aorta     Arthritis     Chronic kidney disease, stage 2 (mild) 05/26/2020    Stage 2 chronic kidney disease    Eczema     Heart murmur 05/01_2025    Hypertension ? 20q3    Leukemia (Multi)     Osteoporosis ?2014    Personal history of other (healed) physical injury and trauma     History of injury of neck    Personal history of other diseases of the digestive system     History of gastroesophageal reflux (GERD)    Personal history of other infectious and parasitic diseases     History of Clostridioides difficile infection    Personal history of other medical treatment     H/O complete eye exam    Personal history of other medical treatment     History of mammogram    Personal history of other medical treatment     H/O bone density study    Personal history of other medical treatment     History of bone scan    Scoliosis 2020    Visual impairment 2020        Surgical History  Past Surgical History:   Procedure Laterality Date    CATARACT EXTRACTION      CHOLECYSTECTOMY  1995?    COLONOSCOPY  03/23/2023    DR. PEREZ - REPEAT 3 YEARS. FINAL DIAGNOSIS A.  ASCENDING COLON POLYP: TUBULAR ADENOMA.  B.  TRANSVERSE COLON POLYP: TUBULAR ADENOMA.  C.  SIGMOID COLON POLYP: TUBULOVILLOUS ADENOMA.    ESOPHAGOGASTRODUODENOSCOPY  06/28/2018    HERNIA REPAIR  2013    HYSTERECTOMY  1990    JOINT REPLACEMENT  2010_/2013    KNEE SURGERY      OOPHORECTOMY  1999    TONSILLECTOMY  1980    TOTAL SHOULDER ARTHROPLASTY Left 12/29/2014    TUBAL LIGATION  1981   Nissen fundoplication and hiatal hernia repair 2019.    Social History  Social History     Tobacco Use    Smoking status: Never    Smokeless tobacco: Never   Vaping Use    Vaping status: Never Used   Substance Use Topics    Alcohol use: Yes     Comment: OCCASSIONALLY    Drug use: Never        Family History  family history includes CARDIAC DISORDER in her sister; Cancer (age of onset: 80 - 99) in her father; Diabetes type II in an other family member; Lung cancer in her father; No Known  Problems in her mother.       Current Medications  Current Outpatient Medications   Medication Instructions    alendronate (FOSAMAX) 70 mg, oral, Every 7 days    amLODIPine (NORVASC) 5 mg, oral, Daily    ascorbic acid (Vitamin C) 500 mg tablet 1 tablet, Daily    atorvastatin (LIPITOR) 10 mg, oral, Nightly    calcium carbonate-vitamin D3 600 mg-20 mcg (800 unit) tablet 1 tablet, 2 times daily    DULoxetine (CYMBALTA) 30 mg, oral, Daily    dchachxmw-E3-xmu-Lg-dph-tuzei (Nicotinamide, with chromium,) 500 mcg- 750 mg tablet 2 times daily    loratadine (Claritin) 10 mg tablet 1 tablet, Daily PRN    losartan (COZAAR) 100 mg, oral, Daily    multivit-min-iron-FA-lutein (Centrum Silver Women) 8 mg iron-400 mcg-300 mcg tablet 1 tablet, Daily    ondansetron (ZOFRAN) 8 mg, oral, 2 times daily PRN    vit C/E/Zn/coppr/lutein/zeaxan (PRESERVISION AREDS-2 ORAL) Daily           OARRS Review  I have personally reviewed the OARRS report for Monica Rosales. I have considered the risks of abuse, dependence, addiction and diversion.  Review of this record shows that she has no prescriptions for sedatives stimulants or opioids.    Vital Signs  /78   Pulse 81   Temp 36 °C (96.8 °F) (Skin)   Resp 16   Wt 82.3 kg (181 lb 6.4 oz)   SpO2 97%   BMI 34.28 kg/m²      Physical Exam  Vitals and nursing note reviewed. Exam conducted with a chaperone present.   Constitutional:       General: She is not in acute distress.     Appearance: She is not ill-appearing or toxic-appearing.      Comments: She is a well-developed well-nourished elderly white female who is very pleased that the large knot on top of her head has fallen off.  She does not appear to be acutely or chronically ill.  She is accompanied by her daughter Domonique.  She is alert, pleasant and engaged in conversation.  Lesions continue to gradually improve.  She is still bothered by the sensation of nodularity in her left cheek where her in situ melanoma was resected.   HENT:       Head: Normocephalic and atraumatic.      Comments: The lump on top of her head has fallen off.  The base is still nodular and thickened.  There is evidence of vascularity but no bleeding.    The posterior extension of the mass which is not as tall as the rest of the mass also continues to decrease.  The tail of this posterior component tapers off to the left almost in a triangle.     Nose: Nose normal.      Mouth/Throat:      Mouth: Mucous membranes are moist.      Pharynx: Oropharynx is clear. No oropharyngeal exudate or posterior oropharyngeal erythema.      Comments: She has an upper plate and she has 4 teeth in the bottom jaw 2 on each side.    Eyes:      General: No scleral icterus.     Extraocular Movements: Extraocular movements intact.      Conjunctiva/sclera: Conjunctivae normal.      Pupils: Pupils are equal, round, and reactive to light.     Neck:      Comments: She has no significant adenopathy in the head neck region, supraclavicular axillary or inguinal regions bilaterally.  Cardiovascular:      Rate and Rhythm: Normal rate and regular rhythm.      Heart sounds: Murmur (systolic murmur 3 out of 6) heard.   Pulmonary:      Effort: Pulmonary effort is normal. No respiratory distress.      Breath sounds: No wheezing, rhonchi or rales.   Abdominal:      General: There is no distension.      Palpations: Abdomen is soft. There is no mass.      Tenderness: There is no abdominal tenderness. There is no guarding or rebound.     Musculoskeletal:         General: Normal range of motion.      Cervical back: Normal range of motion. No rigidity or tenderness.      Right lower leg: No edema.      Left lower leg: No edema.   Lymphadenopathy:      Cervical: No cervical adenopathy.      Left cervical: No deep cervical adenopathy.     Skin:     General: Skin is warm and dry.      Coloration: Skin is pale. Skin is not jaundiced.      Findings: Lesion present.      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  It is hyperpigmented in an arc like shape and is minimally palpable.  It is stable.  At the lowest point in the scar, there is a palpable pinhead sized spot which she says was the site of a suture.    The original tumor \"fell off\" leaving a slightly tender erythematous area. Appears to have less nodularity at the base of the triangle area  Hyperkerototic lesion still on her head size of a quarter.     Neurological:      Mental Status: She is alert and oriented to person, place, and time. Mental status is at baseline.      Cranial Nerves: Cranial nerve deficit present.      Sensory: Sensory deficit present.      Motor: No weakness.      Gait: Gait normal.      Comments: Somewhat hard of hearing.  She is able to get onto the examination table without any difficulty.   Psychiatric:         Mood and Affect: Mood normal.         Behavior: Behavior normal.         Thought Content: Thought content normal.         Judgment: Judgment normal.      Comments: She seems to be coping well.        Current Laboratory Data  See narrative for laboratory data    Recent Imaging  Imaging  See narrative for imaging    Cardiology, Vascular, and Other Imaging  No other imaging results found for the past 7 days        Pathology  Squamous cell carcinoma of the top of her head this is a well-differentiated invasive squamous cell carcinoma which invades beyond subcutaneous fat with extensively positive margins.    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    Performance Status:  Asymptomatic    Assessment/Plan    1.  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  She has been prescribed Cemiplimab which will be given every 3 weeks.  She is to see Dr. Fong again end of July.  She is tolerating the treatment well.  The majority of the lesion has fallen off.    2.  Melanoma in situ.  The " initial biopsies done on 1/20/2025 showed melanoma in situ in the medial skin of the left cheek with peripheral margin focally involved.  The deep margin appears to be uninvolved.  She has a pinhead sized palpable spot at the lowest point in her incision which she says was the site of skin suture.    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    Rebiopsy of the skin from the left medial cheek showed residual melanoma in situ done 2/27/2025.  The margins were focally involved.    This patient may need further surgery on her melanoma on her face.  Lesion has not changed and there is a thickness about mid scar.    3.  Multiple comorbidities.  These include but are not limited to:   Hypertension  Macular degeneration  Tremor  History of C. difficile  History of 2 shoulder replacements  Hysterectomy  Cholecystectomy  Tonsillectomy and adenoidectomy  Osteoporosis  Peripheral neuropathy  Osteoarthritis  Urge incontinence  CKD  GERD  Chronic gastritis and gastric fundal polyps 2018  Diverticular disease  Stress test in 2022 showed normal perfusion and left ventricular ejection fraction estimated at greater than 65%  Aneurysm of the ascending aorta  Colon polyps  Hyperlipidemia  Fractured fingers on the right side June 2016    We will give her her treatment today and we will see her again in 3 weeks.  She knows to call if she has any change in her status, questions or concerns.    Cathy Doshi MD

## 2025-07-18 NOTE — PROGRESS NOTES
Pt getting tx today  Set up for 3 weeks  8/6 8am TIMOTHY haddad tx labs  8/8 830 Cemiplimab  Reviewed AVS with patient- patient verbalizes understanding

## 2025-07-19 ASSESSMENT — ENCOUNTER SYMPTOMS: WOUND: 1

## 2025-08-06 ENCOUNTER — OFFICE VISIT (OUTPATIENT)
Dept: HEMATOLOGY/ONCOLOGY | Facility: CLINIC | Age: 80
End: 2025-08-06
Payer: MEDICARE

## 2025-08-06 VITALS
TEMPERATURE: 96.3 F | BODY MASS INDEX: 34.32 KG/M2 | RESPIRATION RATE: 18 BRPM | HEART RATE: 73 BPM | OXYGEN SATURATION: 95 % | DIASTOLIC BLOOD PRESSURE: 75 MMHG | SYSTOLIC BLOOD PRESSURE: 117 MMHG | WEIGHT: 181.66 LBS

## 2025-08-06 DIAGNOSIS — C44.42 SQUAMOUS CELL CARCINOMA OF SCALP: ICD-10-CM

## 2025-08-06 LAB
ALBUMIN SERPL BCP-MCNC: 4.2 G/DL (ref 3.4–5)
ALP SERPL-CCNC: 55 U/L (ref 33–136)
ALT SERPL W P-5'-P-CCNC: 32 U/L (ref 7–45)
ANION GAP SERPL CALC-SCNC: 11 MMOL/L (ref 10–20)
AST SERPL W P-5'-P-CCNC: 30 U/L (ref 9–39)
BASOPHILS # BLD AUTO: 0.04 X10*3/UL (ref 0–0.1)
BASOPHILS NFR BLD AUTO: 0.4 %
BILIRUB SERPL-MCNC: 0.5 MG/DL (ref 0–1.2)
BUN SERPL-MCNC: 18 MG/DL (ref 6–23)
CALCIUM SERPL-MCNC: 9.5 MG/DL (ref 8.6–10.3)
CHLORIDE SERPL-SCNC: 104 MMOL/L (ref 98–107)
CO2 SERPL-SCNC: 28 MMOL/L (ref 21–32)
CREAT SERPL-MCNC: 1.05 MG/DL (ref 0.5–1.05)
EGFRCR SERPLBLD CKD-EPI 2021: 53 ML/MIN/1.73M*2
EOSINOPHIL # BLD AUTO: 0.15 X10*3/UL (ref 0–0.4)
EOSINOPHIL NFR BLD AUTO: 1.6 %
ERYTHROCYTE [DISTWIDTH] IN BLOOD BY AUTOMATED COUNT: 13.2 % (ref 11.5–14.5)
GLUCOSE SERPL-MCNC: 91 MG/DL (ref 74–99)
HCT VFR BLD AUTO: 41.7 % (ref 36–46)
HGB BLD-MCNC: 13.6 G/DL (ref 12–16)
IMM GRANULOCYTES # BLD AUTO: 0.02 X10*3/UL (ref 0–0.5)
IMM GRANULOCYTES NFR BLD AUTO: 0.2 % (ref 0–0.9)
LDH SERPL L TO P-CCNC: 151 U/L (ref 84–246)
LYMPHOCYTES # BLD AUTO: 2.57 X10*3/UL (ref 0.8–3)
LYMPHOCYTES NFR BLD AUTO: 27.8 %
MCH RBC QN AUTO: 32.3 PG (ref 26–34)
MCHC RBC AUTO-ENTMCNC: 32.6 G/DL (ref 32–36)
MCV RBC AUTO: 99 FL (ref 80–100)
MONOCYTES # BLD AUTO: 0.8 X10*3/UL (ref 0.05–0.8)
MONOCYTES NFR BLD AUTO: 8.7 %
NEUTROPHILS # BLD AUTO: 5.65 X10*3/UL (ref 1.6–5.5)
NEUTROPHILS NFR BLD AUTO: 61.3 %
NRBC BLD-RTO: 0 /100 WBCS (ref 0–0)
PLATELET # BLD AUTO: 380 X10*3/UL (ref 150–450)
POTASSIUM SERPL-SCNC: 4.2 MMOL/L (ref 3.5–5.3)
PROT SERPL-MCNC: 6.4 G/DL (ref 6.4–8.2)
RBC # BLD AUTO: 4.21 X10*6/UL (ref 4–5.2)
SODIUM SERPL-SCNC: 139 MMOL/L (ref 136–145)
WBC # BLD AUTO: 9.2 X10*3/UL (ref 4.4–11.3)

## 2025-08-06 PROCEDURE — 3078F DIAST BP <80 MM HG: CPT | Performed by: INTERNAL MEDICINE

## 2025-08-06 PROCEDURE — 84075 ASSAY ALKALINE PHOSPHATASE: CPT | Performed by: INTERNAL MEDICINE

## 2025-08-06 PROCEDURE — 99213 OFFICE O/P EST LOW 20 MIN: CPT | Performed by: INTERNAL MEDICINE

## 2025-08-06 PROCEDURE — 1126F AMNT PAIN NOTED NONE PRSNT: CPT | Performed by: INTERNAL MEDICINE

## 2025-08-06 PROCEDURE — 1159F MED LIST DOCD IN RCRD: CPT | Performed by: INTERNAL MEDICINE

## 2025-08-06 PROCEDURE — 3074F SYST BP LT 130 MM HG: CPT | Performed by: INTERNAL MEDICINE

## 2025-08-06 PROCEDURE — 83615 LACTATE (LD) (LDH) ENZYME: CPT | Performed by: INTERNAL MEDICINE

## 2025-08-06 PROCEDURE — 36415 COLL VENOUS BLD VENIPUNCTURE: CPT

## 2025-08-06 PROCEDURE — 85025 COMPLETE CBC W/AUTO DIFF WBC: CPT | Performed by: INTERNAL MEDICINE

## 2025-08-06 ASSESSMENT — ENCOUNTER SYMPTOMS
UNEXPECTED WEIGHT CHANGE: 0
JOINT SWELLING: 1
DIAPHORESIS: 1
NUMBNESS: 0
ADENOPATHY: 0
SHORTNESS OF BREATH: 0
TROUBLE SWALLOWING: 0
DYSURIA: 0
APPETITE CHANGE: 0
VOMITING: 0
LIGHT-HEADEDNESS: 0
NERVOUS/ANXIOUS: 0
ARTHRALGIAS: 1
COUGH: 0
FREQUENCY: 0
WEAKNESS: 0
BRUISES/BLEEDS EASILY: 0
ABDOMINAL PAIN: 0
HEADACHES: 0
NAUSEA: 0
CONSTIPATION: 0
WOUND: 1
FATIGUE: 1
DIARRHEA: 0
SLEEP DISTURBANCE: 0

## 2025-08-06 ASSESSMENT — PAIN SCALES - GENERAL: PAINLEVEL_OUTOF10: 0-NO PAIN

## 2025-08-06 NOTE — PROGRESS NOTES
Tx labs drawn today at visit  Has tx set up for 8/8 830  Rtc in 3 weeks  Labs at  the hosp 8/27  Rtc 8/29 8am MD                900 Cemiplimab to follow  Reviewed AVS with patient- patient verbalizes understanding

## 2025-08-06 NOTE — PROGRESS NOTES
Patient ID:Monica Rosales is a 80 y.o. 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          History of the Present Illness  Mrs. Rosales has been found to have rapidly growing squamous cell carcinoma of the vertex of the skull.  She had another one close by that was surgically excised.  This lesion continues to grow.  She was seen by Dr. Cinthia Fong the [facility] who recommended treatment with cemiplimab.  She is here to get treatment closer to home.  The melanoma of the left cheek of her face has been resected twice with positive margins.  It is in situ and margins are positive.    Chronological History  11/5/2024.  Saw Dr. Hampton in follow-up of head trauma after a box falling on her face.  He described a hyperpigmented lesion on her face and referred her to dermatology.    12/17/2024.  Saw Dr. Hampton for a scalp lesion that would not heal.  She said that the lesion had been present for couple months and was not painful.  She said that she put cream on it but it did not respond.  She said it started out tiny but very fast.  He described as a nodular skin lesion with central erosion 1 inch in diameter.  He referred her to plastic surgery.  She was referred see the dermatologist on 1/8/2025 at [facility].    1/6/2025.  Saw Dr. Hampton in follow-up for sinusitis and was given a prescription for oral antibiotics with azithromycin.    1/20/2025.  She had surgery that [facility] including an excision of her left cheek medially x 2 and a shave biopsy of the central occipital scalp.  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    Maximum tumor thickness was 0.1 mm.  No mitoses were seen and there was no lymphovascular invasion.  Tumor infiltrating lymphocytes were seen but nonbrisk.  There was no evidence of invasive melanoma.  No lymph nodes were submitted or found.  This is a pT1a lesion.    The lesion on the central scalp is invasive squamous cell carcinoma well-differentiated and was at least 1.7 cm with a depth of invasion of at least 6 mm.  Perineural invasion was not identified.  The deep and peripheral margins were extensively positive for carcinoma.    2/24/2025 CAT scans of the head and soft tissues of the neck showed no parenchymal or leptomeningeal enhancement.  There is an enhancing lobulated soft tissue lesion in the right parietal scalp at the vertex measuring 2.6 x 2.5 x 1.0 which contacts the underlying calvarium without arlyn involvement.  There is no intracranial extension.    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  Retropharyngeal and prevertebral soft tissues were normal.  There is no significant cervical adenopathy by size criteria but there were a few rounded nonspecific enhancing suboccipital lymph nodes measuring up to 0.6 cm on the right side.  Thyroid was unremarkable as were the parotids, submandibular glands, paranasal sinuses and mastoids.  There was evidence of bilateral lens replacements and degenerative change of the cervical spine was seen.  There is no evidence of high-grade spinal stenosis.    2/26/2025.  Dr. Sterling Padgett submitted another biopsy of the left medial skin of the cheek which showed residual melanoma in situ.  This was reviewed at the [facility] and is said to be a challenging case with features consistent with residual melanoma in situ.    2/28/2025.  [facility] was contacted from  [place] plastic surgery referred to Dr. Fong from Dr. Fernandez Stone for melanoma.    3/3/2025.  DEXA scan showed osteoporosis.    3/6/2025.  Surgical pathology on skin from left medial cheek showed residual melanoma in situ.  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  The provider was Margaret Young MD sending the specimen to [facility] for consultation.    3/31/2025.  Surgical pathology at [facility] reviewed the specimens from 1/20/2025 and those from the second biopsy.  The diagnoses do not appear to have been changed.    4/2/2025.  Patient was referred to Dr. Cinthia Fong at [facility].  She was referred to local oncology for Cemiplimab.  She was to return to [facility] mid July with repeat CT scans.    4/14/2025.  She is here today with her daughter Domonique.  She says that the lesion on the top of her head continues to grow.  She says that sometimes there is a drainage from it.  It is usually not painful but it is annoying and sometimes it itches.  She says it feels as if there is a weight on her head.  She said that she cannot really cover it up with her hair.  She wonders if she can use hair products to cover it up better.  Her daughter encouraged her to just leave it alone.    She knows that she will undergo a series of intravenous therapy treatments and then will have a repeat CT scan and will return to see Dr. Fong mid July for further evaluation.    Mrs. Rosales [place]  She said she never wore a hat and never wore sunscreen.  She is blue-eyed fair skinned.  No one else in her family has had serious skin cancers.    She is otherwise a very vibrant woman.  She is able to perform all of her activities of daily living.  She does not have significant pain.  She has not lost any weight.  She is not aware of any adenopathy.  She has had no bleeding or infection.  She has aching pain from her arthritis.  She denies numbness and tingling.    She  says that she was told by someone that the Cemiplimab will take care of both the squamous cell carcinoma on her vertex of her scalp and also the melanoma on her cheek.  I told her that I did not think that that was the case.    4/14/2025.  Laboratory data showed that her white count was 13.1 hemoglobin 14.4 hematocrit 44.8 with a platelet count of 430,000 with a normal white blood cell differential.  CMP was normal except for an EGFR of 56.  Hepatitis B antigen and antibody were nonreactive as was the core antigen.  Cortisol was normal.  LDH is normal.  ACTH was normal.  TSH was normal.    4/25/2025.  She is here with her daughter to start her treatment.  She says that her appetite is good and her weight is stable.  She has no new lumps or bumps.  She is glad to get this started.  We reviewed some of the most common side effects of this treatment and she knows to ask for help and not to hide her symptoms.  We reviewed her normal laboratory data.  She has had no signs of infection and no signs of bleeding.  They had no new questions.  We said that her elevated white count is probably reactive.  We made sure that she had antinausea medicines available.  We will see her again in about 3 weeks.  She knows to call if she has any problems.    5/16/2025.  She is here today for her next infusion of Cemiplimab.  She did well last time except for a rash that she had on her chest.  She has sent me a picture of it.  In retrospect she said that she had changed the dryer sheets that she used with her wash and she thinks that that might have been the cause of the rash.  The rash subsequently subsided without any significant treatment.  She had no other ill effects.    She said that the mass seems smaller she said that there is a separation of the mass on her head into 2 pieces.  She was encouraged not to pick at it.  She says that it is not painful.  She has had sweats but this is not a change she has had no fevers or chills or  bleeding.  She has had no diarrhea or change in her bowel habits.  Her appetite is good and her weight is stable.  She wondered if this would do anything for her melanoma on her face and I told her that this drug is not indicated for melanoma per se.    She is here today with her daughter.  Multiple questions were answered to her satisfaction.  They have an appointment to go back and see Dr. Fong at [facility] in July.  She will have her treatment today.  We will see her again in 3 weeks.  She was encouraged to call if she has any problems.    6/4/2025.  Laboratory data showed a sugar of 146 BUN of 27 creatinine 1.16 and EGFR 40.  Liver function test were normal.  Today we discussed that she needs to keep hydrated and she verbalized that she does drink several bottles of water a day.  We recommended that she use sports drinks.  She does drink several cups of coffee in the morning.  I told her that that was all right but she needed to continue to push fluids.  It is possible that we may be seeing some toxicity from the Cemiplimab.  I think that it would be all right for her to go forward with her treatment today.    She says that she has a lot of irritation and at the site of the mass on her head.  It has shrunk but it has not fallen off yet.  She says that it is difficult for her to comb her hair and occasionally she will irritate it.  There looks like there might have been some minor bleeding.  Otherwise she has tolerated the medication well.  She has no pain.  Her appetite is good her weight is stable.  She has had no problems with fevers or other signs of infection she does have sweats.  She has had minimal bleeding as noted above.  She is here today with her daughter who affirms this history.  We will see her again in 3 weeks.     6/25/2025.  Laboratory data shows a sugar of 115 BUN of 25 and creatinine of 1.10.  The set of kidney function test is improved from her last set.  Her EGFR has increased from 48-51 but was  not as high as 56.  Liver function tests are normal.  Total protein is normal.  CBC is normal.    6/27/2025.  She is here with her daughter to continue treatment.  She is pleased that the lesion has fallen off.  She had to disentangle it from her hair.  She says that it is not painful.  She has had a good appetite.  Her weight is stable.  She has had sweats but no diarrhea or rashes shortness of breath or other signs of adverse events secondary to her immunotherapy.  We encouraged her to continue hydration and to exercise to the best of her ability.    She already has appointments to be reevaluated for her disease and to see Dr. Fong.  She has another infusion of cemiplimab coming up on 7/18.  We will go ahead with her treatment today.    7/14/2025.  Followed up with Dr. Hampton for her annual Medicare wellness visit at which time he reviewed her chronic conditions..  Multiple blood tests were ordered.    7/16/2025.  Laboratory data showed a sugar of 121 BUN of 24 creatinine 1.15 with an EGFR of 48.  The rest of her chemistries were normal.  CBC was normal with the exception of an MCV of 101.  Absolute neutrophil count was 5.52.    7/17/2025.  Mammogram showed no evidence of malignancy.    7/18/2025.  She is here today accompanied by her daughter.  The lesion on her head continues to improve.  She still has a tumor present which appears to be smaller.  The base of the original lesion is still bumpy.  She is due for treatment today.  She has had no untoward side effects.  She has a good sense of wellbeing.  She currently weighs 181 pounds.  This is a weight gain of 1 pound.    She has upcoming reevaluation and appointment with Dr. Fong.  We will see her again in 3 weeks.    Interval Note  8/1/2025.  CT scan of the head which shows that the previously noted subcutaneous mass at the right posterior parietal scalp near the vertex was no longer there.  There is still some skin thickening and cutaneous adjacent mass  measuring 0.8 x 0.5 cm possibly evolving posttreatment change although underlying malignancy could not be totally excluded.  There were no other cutaneous or subcutaneous lesions and no bony abnormality.  There is no evidence of intracranial mass, adenopathy in the neck, lung nodules or bony abnormalities.    TSH was normal.  Free T4 was normal.  CBC was normal except for an MCV of 98.8 with the upper limits of normal being 97.7.  Serum chemistries were normal except for a creatinine of 1.27.  eGFR was 42.  LDH was normal.    She was seen by the APRN CHARLIE Pizano.  Her responses felt to be good and she was told that she should continue with her treatments for another 3 months and return to see Dr. Newell at that time.  No change was felt in the melanoma in situ in her left cheek but that should be monitored closely.  Daughter said that they were encouraged to take pictures of her face and compared them and take them to her dermatology appointment which is coming up soon.  CAT scans were performed as noted above.    8/6/2025.  She is here today with her daughter to discuss her recent evaluation and the result of continuing her treatment.  She is doing well.  She has had essentially no side effects from the treatment.  She says that she is not surprised that she needs to continue with the treatment.  She is pleased with the results so far with her result which was interpreted on the imaging as a resection but it was actually response to the Cemiplimab.    We will go ahead with her treatment today with laboratory data having been done showing normal serum chemistries with the exception of an EGFR of 53, with normal total protein LDH and glucose.  Her CBC also was normal.  She had minor elevation of her absolute neutrophils at 5.65.  She will be treated on Friday.  We will see her again on 8/29/2025.  She will take serial pictures of her left cheek lesion.    Comorbidities.  Hypertension  Macular  degeneration  Tremor  History of C. difficile  History of 2 shoulder replacements  Hysterectomy  Cholecystectomy  Tonsillectomy and adenoidectomy  Osteoporosis  Peripheral neuropathy  Osteoarthritis  Urge incontinence  CKD, seen by Dr. Tomas after a bout her EL related to her surgery.  GERD  Aneurysm of the ascending aorta  Colon polyps  Hyperlipidemia  Fractured fingers 2016.  Chronic gastritis and gastric fundal polyps 2018  Diverticular disease  Stress test in 2022 showed normal perfusion and left ventricular ejection fraction estimated at greater than 65%    Monitoring Parameters  Histories and physical examinations CAT scans and PET scans    Review of Systems - Oncology   Review of Systems   Constitutional:  Positive for diaphoresis (mild) and fatigue. Negative for appetite change and unexpected weight change.   HENT:  Negative for dental problem, hearing loss, mouth sores and trouble swallowing.    Eyes:  Negative for visual disturbance.   Respiratory:  Negative for cough and shortness of breath.    Cardiovascular:  Negative for leg swelling.   Gastrointestinal:  Negative for abdominal pain, constipation, diarrhea, nausea and vomiting.   Endocrine: Negative for polyuria.   Genitourinary:  Negative for dysuria, frequency and urgency.   Musculoskeletal:  Positive for arthralgias and joint swelling.   Skin:  Positive for pallor and wound (area on back of her head). Negative for rash.   Neurological:  Negative for weakness, light-headedness, numbness and headaches.   Hematological:  Negative for adenopathy. Does not bruise/bleed easily.   Psychiatric/Behavioral:  Negative for self-injury, sleep disturbance and suicidal ideas. The patient is not nervous/anxious.    She has had a tremor for about a year.  She said it was similar to the tremor that her father had.  She has had a history of pneumonia several times but has not required hospitalization.  She says that her sleep is interrupted by the need to  urinate.    Past Medical History  Past Medical History:   Diagnosis Date    Allergic     Aneurysm of the ascending aorta, without rupture 03/01/2022    Aneurysm, ascending aorta    Arthritis     Chronic kidney disease, stage 2 (mild) 05/26/2020    Stage 2 chronic kidney disease    Eczema     Heart murmur 05/01_2025    Hypertension ? 20q3    Leukemia (Multi)     Osteoporosis ?2014    Personal history of other (healed) physical injury and trauma     History of injury of neck    Personal history of other diseases of the digestive system     History of gastroesophageal reflux (GERD)    Personal history of other infectious and parasitic diseases     History of Clostridioides difficile infection    Personal history of other medical treatment     H/O complete eye exam    Personal history of other medical treatment     History of mammogram    Personal history of other medical treatment     H/O bone density study    Personal history of other medical treatment     History of bone scan    Scoliosis 2020    Visual impairment 2020        Surgical History  Past Surgical History:   Procedure Laterality Date    CATARACT EXTRACTION      CHOLECYSTECTOMY  1995?    COLONOSCOPY  03/23/2023    DR. PEREZ - REPEAT 3 YEARS. FINAL DIAGNOSIS A.  ASCENDING COLON POLYP: TUBULAR ADENOMA.  B.  TRANSVERSE COLON POLYP: TUBULAR ADENOMA.  C.  SIGMOID COLON POLYP: TUBULOVILLOUS ADENOMA.    ESOPHAGOGASTRODUODENOSCOPY  06/28/2018    HERNIA REPAIR  2013    HYSTERECTOMY  1990    JOINT REPLACEMENT  2010_/2013    KNEE SURGERY      OOPHORECTOMY  1999    TONSILLECTOMY  1980    TOTAL SHOULDER ARTHROPLASTY Left 12/29/2014    TUBAL LIGATION  1981   Nissen fundoplication and hiatal hernia repair 2019.    Social History  Social History     Tobacco Use    Smoking status: Never    Smokeless tobacco: Never   Vaping Use    Vaping status: Never Used   Substance Use Topics    Alcohol use: Yes     Comment: OCCASSIONALLY    Drug use: Never        Family  History  family history includes CARDIAC DISORDER in her sister; Cancer (age of onset: 80 - 99) in her father; Diabetes type II in an other family member; Lung cancer in her father; No Known Problems in her mother.       Current Medications  Current Outpatient Medications   Medication Instructions    alendronate (FOSAMAX) 70 mg, oral, Every 7 days    amLODIPine (NORVASC) 5 mg, oral, Daily    ascorbic acid (Vitamin C) 500 mg tablet 1 tablet, Daily    atorvastatin (LIPITOR) 10 mg, oral, Nightly    calcium carbonate-vitamin D3 600 mg-20 mcg (800 unit) tablet 1 tablet, 2 times daily    DULoxetine (CYMBALTA) 30 mg, oral, Daily    okpidkdny-Q7-bdq-Fu-gkr-qmiki (Nicotinamide, with chromium,) 500 mcg- 750 mg tablet 2 times daily    loratadine (Claritin) 10 mg tablet 1 tablet, Daily PRN    losartan (COZAAR) 100 mg, oral, Daily    multivit-min-iron-FA-lutein (Centrum Silver Women) 8 mg iron-400 mcg-300 mcg tablet 1 tablet, Daily    ondansetron (ZOFRAN) 8 mg, oral, 2 times daily PRN    vit C/E/Zn/coppr/lutein/zeaxan (PRESERVISION AREDS-2 ORAL) Daily           OARRS Review  I have personally reviewed the OARRS report for Monica Rosales. I have considered the risks of abuse, dependence, addiction and diversion.  Review of this record shows that she has no prescriptions for sedatives stimulants or opioids.    Vital Signs  /75 (BP Location: Right arm, Patient Position: Sitting)   Pulse 73   Temp 35.7 °C (96.3 °F) (Temporal)   Resp 18   Wt 82.4 kg (181 lb 10.5 oz)   SpO2 95%   BMI 34.32 kg/m²      Physical Exam  Vitals and nursing note reviewed. Exam conducted with a chaperone present.   Constitutional:       General: She is not in acute distress.     Appearance: She is not ill-appearing or toxic-appearing.      Comments: She is a well-developed well-nourished elderly white female who is very pleased that the large knot on top of her head has fallen off.  She does not appear to be acutely or chronically ill.  She is  accompanied by her daughter Domonique.  She is alert, pleasant and engaged in conversation.  Lesions continue to gradually improve.  She is still bothered by the sensation of nodularity in her left cheek where her in situ melanoma was resected.   HENT:      Head: Normocephalic and atraumatic.      Comments: The entire lump on top of her head has fallen off.  The base is still nodular and thickened.  There is evidence of vascularity but no bleeding.    There are no new lesions.     Nose: Nose normal.      Mouth/Throat:      Mouth: Mucous membranes are moist.      Pharynx: Oropharynx is clear. No oropharyngeal exudate or posterior oropharyngeal erythema.      Comments: She has an upper plate and she has 4 teeth in the bottom jaw 2 on each side.    Eyes:      General: No scleral icterus.     Extraocular Movements: Extraocular movements intact.      Conjunctiva/sclera: Conjunctivae normal.      Pupils: Pupils are equal, round, and reactive to light.     Neck:      Comments: She has no significant adenopathy in the head neck region, supraclavicular axillary or inguinal regions bilaterally.  Cardiovascular:      Rate and Rhythm: Normal rate and regular rhythm.      Heart sounds: Murmur (systolic murmur 3 out of 6) heard.   Pulmonary:      Effort: Pulmonary effort is normal. No respiratory distress.      Breath sounds: No wheezing, rhonchi or rales.   Abdominal:      General: There is no distension.      Palpations: Abdomen is soft. There is no mass.      Tenderness: There is no abdominal tenderness. There is no guarding or rebound.     Musculoskeletal:         General: Normal range of motion.      Cervical back: Normal range of motion. No rigidity or tenderness.      Right lower leg: No edema.      Left lower leg: No edema.   Lymphadenopathy:      Cervical: No cervical adenopathy.      Left cervical: No deep cervical adenopathy.     Skin:     General: Skin is warm and dry.      Coloration: Skin is pale. Skin is not  jaundiced.      Findings: Lesion present.      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  It is hyperpigmented in an arc like shape and is minimally palpable.  It is stable.  At the lowest point in the scar, there is a palpable pinhead sized spot which she says was the site of a suture.    There is still a hyperkerototic lesion still on her head which seems to be shrinking     Neurological:      Mental Status: She is alert and oriented to person, place, and time. Mental status is at baseline.      Cranial Nerves: Cranial nerve deficit present.      Sensory: Sensory deficit present.      Motor: No weakness.      Gait: Gait normal.      Comments: Somewhat hard of hearing.  She is able to get onto the examination table without any difficulty.   Psychiatric:         Mood and Affect: Mood normal.         Behavior: Behavior normal.         Thought Content: Thought content normal.         Judgment: Judgment normal.      Comments: She seems to be coping well.        Current Laboratory Data  See narrative for laboratory data    Recent Imaging  Imaging  See narrative for imaging    Cardiology, Vascular, and Other Imaging  CT HEAD WITH AND WITHOUT CONTRAST  Result Date: 8/1/2025  EXAM: CT HEAD WITH AND WITHOUT CONTRAST Indication: Squamous cell carcinoma. Technique: 1.  Noncontrast and postcontrast CT of the head with coronal and sagittal reformats. 2.  Contrast-enhanced of the neck with, as well as sagittal reformats.   Comparison: Head and neck CT from 2/24/2025. Findings: Head CT: Previously noted subcutaneous mass at the right posterior parietal scalp near the vertex has been resected.  There is some skin thickening (for example series 13 image 11), and cutaneous adjacent mass (series 13 image 12), measuring up to 0.8 x 0.5 cm, may reflect evolving postsurgical changes, although underlying  residual malignancy cannot be totally excluded based on this appearance.  Correlate with physical exam findings.  No additional cutaneous or subcutaneous lesions.  No osseous abnormality or involvement. No evidence of acute appearing intracranial mass.  No evidence of extra-axial fluid collections.  No evidence of mass effect, midline shift, lesion, hydrocephalus.  No large evolving acute infarct.  No abnormal intracranial postcontrast enhancement.  Mild global parenchymal volume loss.  Major dural venous sinuses are patent. Basal cisterns are patent.  Scattered vascular calcification. Paranasal sinuses and mastoid air cells are clear. No calvarial lesions. Neck CT: Floor of the mouth and base of the tongue structures appear symmetric. Pharynx and larynx appear symmetric.  No associated enhancing masses. No inflammatory changes around the parotid or the submandibular glands.  No enlarged lymph nodes.  No focal thyroid lesions.  Neck vessels are patent. No lung nodules. No suspicious osseous lesions.  Scattered degenerative changes of the cervical spine.    IMPRESSION: Head CT: Previously noted subcutaneous mass at the right posterior parietal scalp near the vertex has been resected.  There is some skin thickening (for example series 13 image 11), and cutaneous adjacent mass (series 13 image 12),  measuring up to 0.8 x 0.5 cm, may reflect evolving postsurgical changes, although underlying residual malignancy cannot be totally excluded based on this appearance.  Correlate with physical exam findings. No abnormal postcontrast enhancement.  No acute intracranial process. Sequelae of small vessel ischemic changes. Neck CT: No enhancing masses.  No enlarged lymph nodes. Toy Delcid M.D. This report has been electronically signed and verified by the Radiologist whose name is printed above. This report contains privileged and confidential information and is intended solely for the use of the individual or entity to which  it is addressed. If you are not the intended recipient of this report, you are hereby notified that any copying, distribution, dissemination or action taken in relation to the contents of this report is strictly prohibited and may be unlawful. If you have received this report in error, please notify the sender immediately at [phone] and permanently delete the original report and destroy any copies or printouts.    CT soft tissue neck w IV contrast  Result Date: 8/1/2025  EXAM: CT HEAD WITH AND WITHOUT CONTRAST Indication: Squamous cell carcinoma. Technique: 1.  Noncontrast and postcontrast CT of the head with coronal and sagittal reformats. 2.  Contrast-enhanced of the neck with, as well as sagittal reformats.   Comparison: Head and neck CT from 2/24/2025. Findings: Head CT: Previously noted subcutaneous mass at the right posterior parietal scalp near the vertex has been resected.  There is some skin thickening (for example series 13 image 11), and cutaneous adjacent mass (series 13 image 12), measuring up to 0.8 x 0.5 cm, may reflect evolving postsurgical changes, although underlying residual malignancy cannot be totally excluded based on this appearance.  Correlate with physical exam findings.  No additional cutaneous or subcutaneous lesions.  No osseous abnormality or involvement. No evidence of acute appearing intracranial mass.  No evidence of extra-axial fluid collections.  No evidence of mass effect, midline shift, lesion, hydrocephalus.  No large evolving acute infarct.  No abnormal intracranial postcontrast enhancement.  Mild global parenchymal volume loss.  Major dural venous sinuses are patent. Basal cisterns are patent.  Scattered vascular calcification. Paranasal sinuses and mastoid air cells are clear. No calvarial lesions. Neck CT: Floor of the mouth and base of the tongue structures appear symmetric. Pharynx and larynx appear symmetric.  No associated enhancing masses. No inflammatory changes  around the parotid or the submandibular glands.  No enlarged lymph nodes.  No focal thyroid lesions.  Neck vessels are patent. No lung nodules. No suspicious osseous lesions.  Scattered degenerative changes of the cervical spine.    IMPRESSION: Head CT: Previously noted subcutaneous mass at the right posterior parietal scalp near the vertex has been resected.  There is some skin thickening (for example series 13 image 11), and cutaneous adjacent mass (series 13 image 12),  measuring up to 0.8 x 0.5 cm, may reflect evolving postsurgical changes, although underlying residual malignancy cannot be totally excluded based on this appearance.  Correlate with physical exam findings. No abnormal postcontrast enhancement.  No acute intracranial process. Sequelae of small vessel ischemic changes. Neck CT: No enhancing masses.  No enlarged lymph nodes. Toy Delcid M.D. This report has been electronically signed and verified by the Radiologist whose name is printed above. This report contains privileged and confidential information and is intended solely for the use of the individual or entity to which it is addressed. If you are not the intended recipient of this report, you are hereby notified that any copying, distribution, dissemination or action taken in relation to the contents of this report is strictly prohibited and may be unlawful. If you have received this report in error, please notify the sender immediately at [phone] and permanently delete the original report and destroy any copies or printouts.    Pathology  Squamous cell carcinoma of the top of her head this is a well-differentiated invasive squamous cell carcinoma which invades beyond subcutaneous fat with extensively positive margins.    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    Performance Status:  Asymptomatic    Assessment/Plan    1.  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  She has been prescribed Cemiplimab  which will be given every 3 weeks.  She has been seen Dr. Fong's group again and is doing well.  She will continue for treatment and return there in 14 to 16 weeks with another set of CT scans.  She will continue to get her treatment here every 3 weeks.  She is tolerating the treatment well.  The majority of the lesion has fallen off and what is left is some thickness at the base with some hypervascularity.    2.  Melanoma in situ.  The initial biopsies done on 1/20/2025 showed melanoma in situ in the medial skin of the left cheek with peripheral margin focally involved.  The deep margin appears to be uninvolved.  She has a pinhead sized palpable spot at the lowest point in her incision which she says was the site of skin suture.    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    Rebiopsy of the skin from the left medial cheek showed residual melanoma in situ done 2/27/2025.  The margins were focally involved.    This patient may need further surgery on her melanoma on her face.  Lesion has not changed and there is a thickness about mid scar.    They have been encouraged to take pictures of her left cheek periodically and have them compare with at the dermatologist's office and bring them with them to the next visit in 14 to 16 weeks.    3.  Multiple comorbidities.  These include but are not limited to:   Hypertension  Macular degeneration  Tremor  History of C. difficile  History of 2 shoulder replacements  Hysterectomy  Cholecystectomy  Tonsillectomy and adenoidectomy  Osteoporosis  Peripheral neuropathy  Osteoarthritis  Urge incontinence  CKD  GERD  Chronic gastritis and gastric fundal polyps 2018  Diverticular disease  Stress test in 2022 showed normal perfusion and left ventricular ejection fraction estimated at greater than 65%  Aneurysm of the ascending aorta  Colon polyps  Hyperlipidemia  Fractured  fingers on the right side June 2016    We will give her her treatment Friday and we will see her again in 3 weeks.  She knows to call if she has any change in her status, questions or concerns.    Cathy Doshi MD

## 2025-08-06 NOTE — PATIENT INSTRUCTIONS
Reviewed labs and recent medical history.  Discussed her visit with her provider at [facility]. Reviewed the recommendations.  Will plan to continue her treatment. She is due for Friday 8/8/25.  Okay to treat on 8/8/25.  She will continue to monitor the area on her face with photos per the recommendation from [facility].  Return to see MD in 3 weeks Aug, 29th, 2025.

## 2025-08-08 ENCOUNTER — INFUSION (OUTPATIENT)
Dept: HEMATOLOGY/ONCOLOGY | Facility: CLINIC | Age: 80
End: 2025-08-08
Payer: MEDICARE

## 2025-08-08 VITALS
DIASTOLIC BLOOD PRESSURE: 73 MMHG | BODY MASS INDEX: 34.32 KG/M2 | SYSTOLIC BLOOD PRESSURE: 136 MMHG | RESPIRATION RATE: 18 BRPM | OXYGEN SATURATION: 96 % | HEART RATE: 82 BPM | WEIGHT: 181.66 LBS | TEMPERATURE: 96.4 F

## 2025-08-08 DIAGNOSIS — C44.42 SQUAMOUS CELL CARCINOMA OF SCALP: Primary | ICD-10-CM

## 2025-08-08 DIAGNOSIS — C44.42 SQUAMOUS CELL CARCINOMA OF SCALP: ICD-10-CM

## 2025-08-08 PROCEDURE — 96413 CHEMO IV INFUSION 1 HR: CPT

## 2025-08-08 PROCEDURE — 2500000004 HC RX 250 GENERAL PHARMACY W/ HCPCS (ALT 636 FOR OP/ED): Performed by: INTERNAL MEDICINE

## 2025-08-08 RX ORDER — EPINEPHRINE 0.3 MG/.3ML
0.3 INJECTION SUBCUTANEOUS EVERY 5 MIN PRN
Status: CANCELLED | OUTPATIENT
Start: 2025-08-08

## 2025-08-08 RX ORDER — ALBUTEROL SULFATE 0.83 MG/ML
3 SOLUTION RESPIRATORY (INHALATION) AS NEEDED
Status: CANCELLED | OUTPATIENT
Start: 2025-08-08

## 2025-08-08 RX ORDER — PROCHLORPERAZINE EDISYLATE 5 MG/ML
10 INJECTION INTRAMUSCULAR; INTRAVENOUS EVERY 6 HOURS PRN
Status: CANCELLED | OUTPATIENT
Start: 2025-08-08

## 2025-08-08 RX ORDER — PROCHLORPERAZINE MALEATE 5 MG
10 TABLET ORAL EVERY 6 HOURS PRN
Status: CANCELLED | OUTPATIENT
Start: 2025-08-08

## 2025-08-08 RX ORDER — DIPHENHYDRAMINE HYDROCHLORIDE 50 MG/ML
50 INJECTION, SOLUTION INTRAMUSCULAR; INTRAVENOUS AS NEEDED
Status: CANCELLED | OUTPATIENT
Start: 2025-08-08

## 2025-08-08 RX ORDER — HEPARIN 100 UNIT/ML
500 SYRINGE INTRAVENOUS AS NEEDED
OUTPATIENT
Start: 2025-08-08

## 2025-08-08 RX ORDER — HEPARIN SODIUM,PORCINE/PF 10 UNIT/ML
50 SYRINGE (ML) INTRAVENOUS AS NEEDED
OUTPATIENT
Start: 2025-08-08

## 2025-08-08 RX ORDER — FAMOTIDINE 10 MG/ML
20 INJECTION, SOLUTION INTRAVENOUS ONCE AS NEEDED
Status: CANCELLED | OUTPATIENT
Start: 2025-08-08

## 2025-08-08 RX ADMIN — CEMIPLIMAB-RWLC 350 MG: 50 INJECTION INTRAVENOUS at 09:00

## 2025-08-08 ASSESSMENT — PAIN SCALES - GENERAL: PAINLEVEL_OUTOF10: 0-NO PAIN

## 2025-08-27 ENCOUNTER — LAB (OUTPATIENT)
Dept: LAB | Facility: HOSPITAL | Age: 80
End: 2025-08-27
Payer: MEDICARE

## 2025-08-27 LAB
ALBUMIN SERPL BCP-MCNC: 4.3 G/DL (ref 3.4–5)
ALP SERPL-CCNC: 61 U/L (ref 33–136)
ALT SERPL W P-5'-P-CCNC: 22 U/L (ref 7–45)
ANION GAP SERPL CALC-SCNC: 11 MMOL/L (ref 10–20)
AST SERPL W P-5'-P-CCNC: 25 U/L (ref 9–39)
BASOPHILS # BLD AUTO: 0.05 X10*3/UL (ref 0–0.1)
BASOPHILS NFR BLD AUTO: 0.5 %
BILIRUB SERPL-MCNC: 0.8 MG/DL (ref 0–1.2)
BUN SERPL-MCNC: 20 MG/DL (ref 6–23)
CALCIUM SERPL-MCNC: 9.7 MG/DL (ref 8.6–10.3)
CHLORIDE SERPL-SCNC: 103 MMOL/L (ref 98–107)
CO2 SERPL-SCNC: 30 MMOL/L (ref 21–32)
CREAT SERPL-MCNC: 1.1 MG/DL (ref 0.5–1.05)
EGFRCR SERPLBLD CKD-EPI 2021: 51 ML/MIN/1.73M*2
EOSINOPHIL # BLD AUTO: 0.17 X10*3/UL (ref 0–0.4)
EOSINOPHIL NFR BLD AUTO: 1.8 %
ERYTHROCYTE [DISTWIDTH] IN BLOOD BY AUTOMATED COUNT: 13 % (ref 11.5–14.5)
GLUCOSE SERPL-MCNC: 100 MG/DL (ref 74–99)
HCT VFR BLD AUTO: 44.4 % (ref 36–46)
HGB BLD-MCNC: 14.5 G/DL (ref 12–16)
IMM GRANULOCYTES # BLD AUTO: 0.01 X10*3/UL (ref 0–0.5)
IMM GRANULOCYTES NFR BLD AUTO: 0.1 % (ref 0–0.9)
LDH SERPL L TO P-CCNC: 170 U/L (ref 84–246)
LYMPHOCYTES # BLD AUTO: 2.95 X10*3/UL (ref 0.8–3)
LYMPHOCYTES NFR BLD AUTO: 31.1 %
MCH RBC QN AUTO: 32.6 PG (ref 26–34)
MCHC RBC AUTO-ENTMCNC: 32.7 G/DL (ref 32–36)
MCV RBC AUTO: 100 FL (ref 80–100)
MONOCYTES # BLD AUTO: 0.93 X10*3/UL (ref 0.05–0.8)
MONOCYTES NFR BLD AUTO: 9.8 %
NEUTROPHILS # BLD AUTO: 5.39 X10*3/UL (ref 1.6–5.5)
NEUTROPHILS NFR BLD AUTO: 56.7 %
NRBC BLD-RTO: 0 /100 WBCS (ref 0–0)
PLATELET # BLD AUTO: 388 X10*3/UL (ref 150–450)
POTASSIUM SERPL-SCNC: 4.2 MMOL/L (ref 3.5–5.3)
PROT SERPL-MCNC: 6.7 G/DL (ref 6.4–8.2)
RBC # BLD AUTO: 4.45 X10*6/UL (ref 4–5.2)
SODIUM SERPL-SCNC: 140 MMOL/L (ref 136–145)
WBC # BLD AUTO: 9.5 X10*3/UL (ref 4.4–11.3)

## 2025-08-29 ENCOUNTER — OFFICE VISIT (OUTPATIENT)
Dept: HEMATOLOGY/ONCOLOGY | Facility: CLINIC | Age: 80
End: 2025-08-29
Payer: MEDICARE

## 2025-08-29 ENCOUNTER — INFUSION (OUTPATIENT)
Dept: HEMATOLOGY/ONCOLOGY | Facility: CLINIC | Age: 80
End: 2025-08-29
Payer: MEDICARE

## 2025-08-29 VITALS — DIASTOLIC BLOOD PRESSURE: 75 MMHG | SYSTOLIC BLOOD PRESSURE: 115 MMHG | HEART RATE: 55 BPM

## 2025-08-29 DIAGNOSIS — C44.42 SQUAMOUS CELL CARCINOMA OF SCALP: ICD-10-CM

## 2025-08-29 PROCEDURE — 96413 CHEMO IV INFUSION 1 HR: CPT

## 2025-08-29 PROCEDURE — 2500000004 HC RX 250 GENERAL PHARMACY W/ HCPCS (ALT 636 FOR OP/ED): Performed by: INTERNAL MEDICINE

## 2025-08-29 RX ADMIN — CEMIPLIMAB-RWLC 350 MG: 50 INJECTION INTRAVENOUS at 09:11

## 2025-08-29 ASSESSMENT — ENCOUNTER SYMPTOMS
TROUBLE SWALLOWING: 0
ARTHRALGIAS: 1
WEAKNESS: 0
WOUND: 1
FATIGUE: 1
LIGHT-HEADEDNESS: 0
ADENOPATHY: 0
SHORTNESS OF BREATH: 0
HEADACHES: 0
DIAPHORESIS: 1
NAUSEA: 0
APPETITE CHANGE: 0
NERVOUS/ANXIOUS: 0
CONSTIPATION: 0
NUMBNESS: 0
UNEXPECTED WEIGHT CHANGE: 0
JOINT SWELLING: 1
FREQUENCY: 0
DYSURIA: 0
DIARRHEA: 0
ABDOMINAL PAIN: 0
VOMITING: 0
BRUISES/BLEEDS EASILY: 0
SLEEP DISTURBANCE: 0
COUGH: 0

## 2025-08-29 ASSESSMENT — PAIN SCALES - GENERAL: PAINLEVEL_OUTOF10: 0-NO PAIN

## 2026-01-14 ENCOUNTER — APPOINTMENT (OUTPATIENT)
Dept: PRIMARY CARE | Facility: CLINIC | Age: 81
End: 2026-01-14
Payer: MEDICARE

## 2026-07-14 ENCOUNTER — APPOINTMENT (OUTPATIENT)
Dept: PRIMARY CARE | Facility: CLINIC | Age: 81
End: 2026-07-14
Payer: MEDICARE